# Patient Record
Sex: MALE | Race: BLACK OR AFRICAN AMERICAN | NOT HISPANIC OR LATINO | Employment: UNEMPLOYED | URBAN - METROPOLITAN AREA
[De-identification: names, ages, dates, MRNs, and addresses within clinical notes are randomized per-mention and may not be internally consistent; named-entity substitution may affect disease eponyms.]

---

## 2020-03-19 ENCOUNTER — HOSPITAL ENCOUNTER (OUTPATIENT)
Facility: HOSPITAL | Age: 55
Setting detail: OBSERVATION
Discharge: HOME | End: 2020-03-20
Attending: EMERGENCY MEDICINE | Admitting: INTERNAL MEDICINE
Payer: COMMERCIAL

## 2020-03-19 ENCOUNTER — APPOINTMENT (EMERGENCY)
Dept: RADIOLOGY | Facility: HOSPITAL | Age: 55
End: 2020-03-19
Payer: COMMERCIAL

## 2020-03-19 ENCOUNTER — APPOINTMENT (EMERGENCY)
Dept: CT IMAGING | Facility: HOSPITAL | Age: 55
End: 2020-03-19
Payer: COMMERCIAL

## 2020-03-19 DIAGNOSIS — K80.20 GALLSTONE: ICD-10-CM

## 2020-03-19 DIAGNOSIS — R52 PAIN: ICD-10-CM

## 2020-03-19 DIAGNOSIS — R07.9 CHEST PAIN: Primary | ICD-10-CM

## 2020-03-19 DIAGNOSIS — K44.9 HIATAL HERNIA: ICD-10-CM

## 2020-03-19 DIAGNOSIS — K21.9 GERD (GASTROESOPHAGEAL REFLUX DISEASE): ICD-10-CM

## 2020-03-19 DIAGNOSIS — I10 ESSENTIAL HYPERTENSION: ICD-10-CM

## 2020-03-19 DIAGNOSIS — R93.5 ABNORMAL CT OF THE ABDOMEN: ICD-10-CM

## 2020-03-19 LAB
ALBUMIN SERPL BCP-MCNC: 4 G/DL (ref 3.5–5)
ALP SERPL-CCNC: 77 U/L (ref 46–116)
ALT SERPL W P-5'-P-CCNC: 63 U/L (ref 12–78)
ANION GAP SERPL CALCULATED.3IONS-SCNC: 8 MMOL/L (ref 4–13)
APTT PPP: 35 SECONDS (ref 23–37)
AST SERPL W P-5'-P-CCNC: 47 U/L (ref 5–45)
BASOPHILS # BLD AUTO: 0.02 THOUSANDS/ΜL (ref 0–0.1)
BASOPHILS NFR BLD AUTO: 1 % (ref 0–1)
BILIRUB SERPL-MCNC: 0.74 MG/DL (ref 0.2–1)
BUN SERPL-MCNC: 10 MG/DL (ref 5–25)
CALCIUM SERPL-MCNC: 9.1 MG/DL (ref 8.3–10.1)
CHLORIDE SERPL-SCNC: 103 MMOL/L (ref 100–108)
CK SERPL-CCNC: 170 U/L (ref 39–308)
CO2 SERPL-SCNC: 29 MMOL/L (ref 21–32)
CREAT SERPL-MCNC: 1.13 MG/DL (ref 0.6–1.3)
D DIMER PPP FEU-MCNC: 0.78 UG/ML FEU
EOSINOPHIL # BLD AUTO: 0.04 THOUSAND/ΜL (ref 0–0.61)
EOSINOPHIL NFR BLD AUTO: 1 % (ref 0–6)
ERYTHROCYTE [DISTWIDTH] IN BLOOD BY AUTOMATED COUNT: 13.2 % (ref 11.6–15.1)
GFR SERPL CREATININE-BSD FRML MDRD: 84 ML/MIN/1.73SQ M
GLUCOSE SERPL-MCNC: 111 MG/DL (ref 65–140)
HCT VFR BLD AUTO: 44.1 % (ref 36.5–49.3)
HGB BLD-MCNC: 15.5 G/DL (ref 12–17)
IMM GRANULOCYTES # BLD AUTO: 0 THOUSAND/UL (ref 0–0.2)
IMM GRANULOCYTES NFR BLD AUTO: 0 % (ref 0–2)
INR PPP: 1.18 (ref 0.84–1.19)
LIPASE SERPL-CCNC: 161 U/L (ref 73–393)
LYMPHOCYTES # BLD AUTO: 1.75 THOUSANDS/ΜL (ref 0.6–4.47)
LYMPHOCYTES NFR BLD AUTO: 43 % (ref 14–44)
MCH RBC QN AUTO: 34.2 PG (ref 26.8–34.3)
MCHC RBC AUTO-ENTMCNC: 35.1 G/DL (ref 31.4–37.4)
MCV RBC AUTO: 97 FL (ref 82–98)
MONOCYTES # BLD AUTO: 0.49 THOUSAND/ΜL (ref 0.17–1.22)
MONOCYTES NFR BLD AUTO: 12 % (ref 4–12)
NEUTROPHILS # BLD AUTO: 1.82 THOUSANDS/ΜL (ref 1.85–7.62)
NEUTS SEG NFR BLD AUTO: 43 % (ref 43–75)
NRBC BLD AUTO-RTO: 0 /100 WBCS
PLATELET # BLD AUTO: 113 THOUSANDS/UL (ref 149–390)
PMV BLD AUTO: 10.5 FL (ref 8.9–12.7)
POTASSIUM SERPL-SCNC: 3.9 MMOL/L (ref 3.5–5.3)
PROT SERPL-MCNC: 8 G/DL (ref 6.4–8.2)
PROTHROMBIN TIME: 14.4 SECONDS (ref 11.6–14.5)
RBC # BLD AUTO: 4.53 MILLION/UL (ref 3.88–5.62)
SODIUM SERPL-SCNC: 140 MMOL/L (ref 136–145)
TROPONIN I SERPL-MCNC: <0.02 NG/ML
WBC # BLD AUTO: 4.12 THOUSAND/UL (ref 4.31–10.16)

## 2020-03-19 PROCEDURE — 93005 ELECTROCARDIOGRAM TRACING: CPT

## 2020-03-19 PROCEDURE — 82550 ASSAY OF CK (CPK): CPT | Performed by: EMERGENCY MEDICINE

## 2020-03-19 PROCEDURE — 85610 PROTHROMBIN TIME: CPT | Performed by: EMERGENCY MEDICINE

## 2020-03-19 PROCEDURE — 80053 COMPREHEN METABOLIC PANEL: CPT | Performed by: EMERGENCY MEDICINE

## 2020-03-19 PROCEDURE — 36415 COLL VENOUS BLD VENIPUNCTURE: CPT | Performed by: EMERGENCY MEDICINE

## 2020-03-19 PROCEDURE — 71046 X-RAY EXAM CHEST 2 VIEWS: CPT

## 2020-03-19 PROCEDURE — 83690 ASSAY OF LIPASE: CPT | Performed by: EMERGENCY MEDICINE

## 2020-03-19 PROCEDURE — 85379 FIBRIN DEGRADATION QUANT: CPT | Performed by: EMERGENCY MEDICINE

## 2020-03-19 PROCEDURE — 99285 EMERGENCY DEPT VISIT HI MDM: CPT

## 2020-03-19 PROCEDURE — 85730 THROMBOPLASTIN TIME PARTIAL: CPT | Performed by: EMERGENCY MEDICINE

## 2020-03-19 PROCEDURE — 99284 EMERGENCY DEPT VISIT MOD MDM: CPT | Performed by: EMERGENCY MEDICINE

## 2020-03-19 PROCEDURE — 83036 HEMOGLOBIN GLYCOSYLATED A1C: CPT | Performed by: PHYSICIAN ASSISTANT

## 2020-03-19 PROCEDURE — 84484 ASSAY OF TROPONIN QUANT: CPT | Performed by: EMERGENCY MEDICINE

## 2020-03-19 PROCEDURE — 82553 CREATINE MB FRACTION: CPT | Performed by: EMERGENCY MEDICINE

## 2020-03-19 PROCEDURE — 85025 COMPLETE CBC W/AUTO DIFF WBC: CPT | Performed by: EMERGENCY MEDICINE

## 2020-03-19 PROCEDURE — 71275 CT ANGIOGRAPHY CHEST: CPT

## 2020-03-19 PROCEDURE — 96360 HYDRATION IV INFUSION INIT: CPT

## 2020-03-19 RX ORDER — LISINOPRIL AND HYDROCHLOROTHIAZIDE 20; 12.5 MG/1; MG/1
1 TABLET ORAL DAILY
COMMUNITY

## 2020-03-19 RX ORDER — ASPIRIN 81 MG/1
162 TABLET, CHEWABLE ORAL ONCE
Status: COMPLETED | OUTPATIENT
Start: 2020-03-19 | End: 2020-03-19

## 2020-03-19 RX ADMIN — IOHEXOL 100 ML: 350 INJECTION, SOLUTION INTRAVENOUS at 23:59

## 2020-03-19 RX ADMIN — ASPIRIN 81 MG 162 MG: 81 TABLET ORAL at 22:45

## 2020-03-19 RX ADMIN — NITROGLYCERIN 0.5 INCH: 20 OINTMENT TOPICAL at 22:46

## 2020-03-19 RX ADMIN — SODIUM CHLORIDE 1000 ML: 0.9 INJECTION, SOLUTION INTRAVENOUS at 23:45

## 2020-03-20 VITALS
SYSTOLIC BLOOD PRESSURE: 112 MMHG | OXYGEN SATURATION: 98 % | DIASTOLIC BLOOD PRESSURE: 62 MMHG | RESPIRATION RATE: 16 BRPM | TEMPERATURE: 97.9 F | HEART RATE: 85 BPM | WEIGHT: 143.96 LBS | HEIGHT: 64 IN | BODY MASS INDEX: 24.58 KG/M2

## 2020-03-20 PROBLEM — R93.5 ABNORMAL CT OF THE ABDOMEN: Status: ACTIVE | Noted: 2020-03-20

## 2020-03-20 PROBLEM — R07.9 CHEST PAIN: Status: ACTIVE | Noted: 2020-03-20

## 2020-03-20 PROBLEM — R93.89 ABNORMAL CT SCAN: Status: ACTIVE | Noted: 2020-03-20

## 2020-03-20 PROBLEM — I10 ESSENTIAL HYPERTENSION: Status: ACTIVE | Noted: 2020-03-20

## 2020-03-20 LAB
ANION GAP SERPL CALCULATED.3IONS-SCNC: 8 MMOL/L (ref 4–13)
ATRIAL RATE: 83 BPM
BUN SERPL-MCNC: 9 MG/DL (ref 5–25)
CALCIUM SERPL-MCNC: 8.9 MG/DL (ref 8.3–10.1)
CHLORIDE SERPL-SCNC: 104 MMOL/L (ref 100–108)
CHOLEST SERPL-MCNC: 155 MG/DL (ref 50–200)
CK MB SERPL-MCNC: 0.5 NG/ML (ref 0–5)
CK MB SERPL-MCNC: <1 % (ref 0–2.5)
CO2 SERPL-SCNC: 28 MMOL/L (ref 21–32)
CREAT SERPL-MCNC: 1.06 MG/DL (ref 0.6–1.3)
ERYTHROCYTE [DISTWIDTH] IN BLOOD BY AUTOMATED COUNT: 13.3 % (ref 11.6–15.1)
EST. AVERAGE GLUCOSE BLD GHB EST-MCNC: 97 MG/DL
GFR SERPL CREATININE-BSD FRML MDRD: 90 ML/MIN/1.73SQ M
GLUCOSE P FAST SERPL-MCNC: 96 MG/DL (ref 65–99)
GLUCOSE SERPL-MCNC: 96 MG/DL (ref 65–140)
HBA1C MFR BLD: 5 %
HCT VFR BLD AUTO: 39.7 % (ref 36.5–49.3)
HDLC SERPL-MCNC: 61 MG/DL
HGB BLD-MCNC: 13.8 G/DL (ref 12–17)
LDLC SERPL CALC-MCNC: 85 MG/DL (ref 0–100)
MCH RBC QN AUTO: 34.2 PG (ref 26.8–34.3)
MCHC RBC AUTO-ENTMCNC: 34.8 G/DL (ref 31.4–37.4)
MCV RBC AUTO: 98 FL (ref 82–98)
NONHDLC SERPL-MCNC: 94 MG/DL
P AXIS: 69 DEGREES
PLATELET # BLD AUTO: 101 THOUSANDS/UL (ref 149–390)
PMV BLD AUTO: 11 FL (ref 8.9–12.7)
POTASSIUM SERPL-SCNC: 3.8 MMOL/L (ref 3.5–5.3)
PR INTERVAL: 172 MS
QRS AXIS: 51 DEGREES
QRSD INTERVAL: 74 MS
QT INTERVAL: 340 MS
QTC INTERVAL: 399 MS
RBC # BLD AUTO: 4.04 MILLION/UL (ref 3.88–5.62)
SODIUM SERPL-SCNC: 140 MMOL/L (ref 136–145)
T WAVE AXIS: 67 DEGREES
TRIGL SERPL-MCNC: 46 MG/DL
TROPONIN I SERPL-MCNC: <0.02 NG/ML
TROPONIN I SERPL-MCNC: <0.02 NG/ML
VENTRICULAR RATE: 83 BPM
WBC # BLD AUTO: 4.66 THOUSAND/UL (ref 4.31–10.16)

## 2020-03-20 PROCEDURE — 93010 ELECTROCARDIOGRAM REPORT: CPT | Performed by: INTERNAL MEDICINE

## 2020-03-20 PROCEDURE — 80061 LIPID PANEL: CPT | Performed by: PHYSICIAN ASSISTANT

## 2020-03-20 PROCEDURE — C9113 INJ PANTOPRAZOLE SODIUM, VIA: HCPCS | Performed by: EMERGENCY MEDICINE

## 2020-03-20 PROCEDURE — 80048 BASIC METABOLIC PNL TOTAL CA: CPT | Performed by: PHYSICIAN ASSISTANT

## 2020-03-20 PROCEDURE — 99236 HOSP IP/OBS SAME DATE HI 85: CPT | Performed by: INTERNAL MEDICINE

## 2020-03-20 PROCEDURE — 84484 ASSAY OF TROPONIN QUANT: CPT | Performed by: PHYSICIAN ASSISTANT

## 2020-03-20 PROCEDURE — 85027 COMPLETE CBC AUTOMATED: CPT | Performed by: PHYSICIAN ASSISTANT

## 2020-03-20 RX ORDER — ALUMINUM HYDROXIDE, MAGNESIUM HYDROXIDE, SIMETHICONE 400; 400; 40 MG/10ML; MG/10ML; MG/10ML
15 SUSPENSION ORAL 3 TIMES DAILY PRN
Qty: 355 ML | Refills: 0 | Status: SHIPPED | OUTPATIENT
Start: 2020-03-20

## 2020-03-20 RX ORDER — PANTOPRAZOLE SODIUM 40 MG/1
40 INJECTION, POWDER, FOR SOLUTION INTRAVENOUS ONCE
Status: COMPLETED | OUTPATIENT
Start: 2020-03-20 | End: 2020-03-20

## 2020-03-20 RX ORDER — ACETAMINOPHEN 325 MG/1
650 TABLET ORAL EVERY 6 HOURS PRN
Status: DISCONTINUED | OUTPATIENT
Start: 2020-03-20 | End: 2020-03-20 | Stop reason: HOSPADM

## 2020-03-20 RX ORDER — PANTOPRAZOLE SODIUM 40 MG/1
40 TABLET, DELAYED RELEASE ORAL
Qty: 30 TABLET | Refills: 0 | Status: SHIPPED | OUTPATIENT
Start: 2020-03-20

## 2020-03-20 RX ORDER — ACETAMINOPHEN 325 MG/1
650 TABLET ORAL EVERY 6 HOURS PRN
Refills: 0
Start: 2020-03-20

## 2020-03-20 RX ADMIN — LISINOPRIL: 20 TABLET ORAL at 10:23

## 2020-03-20 RX ADMIN — PANTOPRAZOLE SODIUM 40 MG: 40 INJECTION, POWDER, FOR SOLUTION INTRAVENOUS at 00:40

## 2020-03-20 NOTE — UTILIZATION REVIEW
Initial Clinical Review    Admission: Date/Time/Statement: Admission Orders (From admission, onward)     Ordered        03/20/20 0028  Place in Observation  Once                   Orders Placed This Encounter   Procedures    Place in Observation     Telemetry     Standing Status:   Standing     Number of Occurrences:   1     Order Specific Question:   Admitting Physician     Answer:   Eddie Red [[id number]]     Order Specific Question:   Level of Care     Answer:   Med Surg [[id number]]     Order Specific Question:   Bed request comments     Answer:   telemetry     ED Arrival Information     Expected Arrival [address] Yuly Clarke of Arrival Escorted By Service Admission Type    3/19/2020  3/19/2020 22:19 Emergent [facility]In [facility] Hospitalist Emergency    Arrival Complaint    Chest Pain ([employer])        Chief Complaint   Patient presents with    Chest Pain     patient reports having chest pain starting in the beginning of the week  denies SOB, wheezing or difficluty breathing  no cough  no cardiac history previously  Assessment/Plan:   Mr Sherif Villafuerte is a 64 yo male who presents to the ED from home with c/o intermittent chest pressure with L arm and leg pain and weakness with it  Rated 9-10/10 at it's worst   Recently returned from [place] on a 9 hr flight  Has elevated D dimer but CTA was negative for PE  Has recent unintentional weight loss and frequent bathroom visits with decreased appetite  + polyuria  PMH:  HTN  He is admitted to OBSERVATION status with Chest Pain - trend troponins, tele, fasting lipid and A1C  HTN - home Lisinopril and HCTZ        ED Triage Vitals   Temperature Pulse Respirations Blood Pressure SpO2   03/19/20 2233 03/19/20 2228 03/19/20 2228 03/19/20 2228 03/19/20 2228   98 5 °F (36 9 °C) 95 18 133/78 99 %      Temp Source Heart Rate Source Patient Position - Orthostatic VS BP Location FiO2 (%)   03/19/20 2233 03/19/20 2228 03/19/20 2228 03/19/20 2228 --   Oral Monitor Sitting Right arm Pain Score       03/19/20 2300       No Pain        Wt Readings from Last 1 Encounters:   03/19/20 65 3 kg (143 lb 15 4 oz)     Additional Vital Signs:   03/20/20 0734  97 9 °F (36 6 °C)  85  16  106/58    98 %  None (Room air)   03/20/20 0101    88  18  121/62    98 %  None (Room air)   03/20/20 0045    82        99 %     03/20/20 0030    70  18  129/72  94  99 %  None (Room air)   03/19/20 2330    74  18  126/66  90  99 %  None (Room air)   03/19/20 2303              None (Room air)   03/19/20 2300    92  18  123/69  91  99 %  None (Room air)     Pertinent Labs/Diagnostic Test Results:     3/19 CXR - no focal consolidation     3/19 CTA ED chest PE study - 1  No evidence of pulmonary embolism  2   No focal consolidation  3   Small hiatal hernia and thickening of the esophagus which may be due to esophagitis, correlate with endoscopy  4   Nodular contour of the liver, correlate for cirrhosis  5   Cholelithiasis without evidence of cholecystitis      3/19 ECG - NSR possible LAE    Results from last 7 days   Lab Units 03/20/20  0438 03/19/20  2257   WBC Thousand/uL 4 66 4 12*   HEMOGLOBIN g/dL 13 8 15 5   HEMATOCRIT % 39 7 44 1   PLATELETS Thousands/uL 101* 113*   NEUTROS ABS Thousands/µL  --  1 82*         Results from last 7 days   Lab Units 03/20/20  0438 03/19/20  2257   SODIUM mmol/L 140 140   POTASSIUM mmol/L 3 8 3 9   CHLORIDE mmol/L 104 103   CO2 mmol/L 28 29   ANION GAP mmol/L 8 8   BUN mg/dL 9 10   CREATININE mg/dL 1 06 1 13   EGFR ml/min/1 73sq m 90 84   CALCIUM mg/dL 8 9 9 1     Results from last 7 days   Lab Units 03/19/20  2257   AST U/L 47*   ALT U/L 63   ALK PHOS U/L 77   TOTAL PROTEIN g/dL 8 0   ALBUMIN g/dL 4 0   TOTAL BILIRUBIN mg/dL 0 74     Results from last 7 days   Lab Units 03/20/20  0438 03/19/20  2257   GLUCOSE RANDOM mg/dL 96 111     Results from last 7 days   Lab Units 03/19/20  2257   HEMOGLOBIN A1C % 5 0   EAG mg/dl 97     Results from last 7 days   Lab Units 03/19/20  2257   CK TOTAL U/L 170   CK MB INDEX % <1 0   CK MB ng/mL 0 5     Results from last 7 days   Lab Units 03/20/20  0438 03/20/20  0131 03/19/20  2257   TROPONIN I ng/mL <0 02 <0 02 <0 02     Results from last 7 days   Lab Units 03/19/20  2257   D-DIMER QUANTITATIVE ug/ml FEU 0 78*     Results from last 7 days   Lab Units 03/19/20  2257   PROTIME seconds 14 4   INR  1 18   PTT seconds 35     Results from last 7 days   Lab Units 03/19/20  2257   LIPASE u/L 161     ED Treatment:   Medication Administration from 03/19/2020 2150 to 03/20/2020 0115    Date/Time Order Dose Route Action   03/19/2020 2245 aspirin chewable tablet 162 mg 162 mg Oral Given   03/19/2020 2246 nitroglycerin (NITRO-BID) 2 % TD ointment 0 5 inch 0 5 inch Topical Given   03/19/2020 2345 sodium chloride 0 9 % bolus 1,000 mL 1,000 mL Intravenous New Bag   03/19/2020 2359 iohexol (OMNIPAQUE) 350 MG/ML injection (MULTI-DOSE) 100 mL 100 mL Intravenous Given   03/20/2020 0040 pantoprazole (PROTONIX) injection 40 mg 40 mg Intravenous Given        Past Medical History:   Diagnosis Date    Hypertension      Present on Admission:   Abnormal CT of the abdomen      Admitting Diagnosis: Hiatal hernia [K44 9]  Gallstone [K80 20]  Chest pain [R07 9]     Age/Sex: 63 y o  male     Admission Orders:  Scheduled Medications:    Medications:  lisinopril-hydrochlorothiazide (PRINZIDE 20/12  5) combo dose  Oral Daily     Continuous IV Infusions:     PRN Meds:    acetaminophen 650 mg Oral Q6H PRN     Tele  SCDs  Trend troponins  Ambulate q shift   Cardiac diet, no caffeine, chocolate    Network Utilization Review Department  [email or web address]  ATTENTION: Please call with any questions or concerns to [phone] and carefully listen to the prompts so that you are directed to the right person   All voicemails are confidential   Gary Villaseñor all requests for admission clinical reviews, approved or denied determinations and any other requests to dedicated fax number below belonging to the campus where the patient is receiving treatment   List of dedicated fax numbers for the Facilities:  [address] [place] [address] DENIALS (Administrative/Medical Necessity) [phone]   [address]  (Maternity/NICU/Pediatrics) [phone]   [facility] [phone]   Holden Florencia [phone]   [facility] De Anda [phone]   Alexsandra Ingram [phone]   [address] [place] [address] [phone]   [facility]  [phone]   [address] [facility]  [address] [facility] [address] [facility] [phone]

## 2020-03-20 NOTE — ASSESSMENT & PLAN NOTE
· Blood pressure is well controlled in the emergency department  · Patient is on outpatient combination of lisinopril and hydrochlorothiazide  · Will divide these give lisinopril 20 mg daily, and hydrochlorothiazide 12 5 mg daily  · Monitor BP

## 2020-03-20 NOTE — ASSESSMENT & PLAN NOTE
 Patient Presentation:  Has been having chest pressure for approximately 1 week that is intermittent in nature  Denies worsening with exertion   Likely etiology:  Unclear etiology at this time could be related to MSK versus GERD  Doubt ACS however   VALENTINE: 0   Initial Troponin: neg  o Trend x3 or to peak   Initial EKG: NSR with no ST/T wave changes of ischemia   o Monitor on telemetry   Check fasting lipid panel and A1c   Admit under Observation Status

## 2020-03-20 NOTE — DISCHARGE INSTR - AVS FIRST PAGE
Dear Meliton Reza,     It was our pleasure to care for you here at [facility]  It is our hope that we were always able to exceed the expected standards for your care during your stay  You were hospitalized due to chest pain (no heart attack; likely due to heartburn with possible inflammation/thickening of the esophagus)  You were cared for on the 3rd floor under the service of Edin Cisneros MD with the [facility] who covers for your primary care physician (PCP), No primary care provider on file  , while you were hospitalized  If you have any questions or concerns related to this hospitalization, you may contact us at [phone]  For follow up as well as medication refills, we recommend that you follow up with your primary care physician  A registered nurse will reach out to you by phone within a few days after your discharge to answer any additional questions that you may have after going home  However, at this time we provide for you here, the most important instructions / recommendations at discharge:     · Notable Medication Adjustments -   · You were prescribed with Protonix to be taken before breakfast   This will prevent acid reflux from happening, that may have caused your chest pain  You were also prescribed with Maalox, to be taken on as needed basis if with heartburn  · Testing Required after Discharge -   · You may need an upper endoscopy in the outpatient  The gastroenterologist will evaluate you for this  · Important follow up information -   · I already referred you to follow-up with outpatient doctors which include:  Primary care physician and gastroenterologist   · Other Instructions -   · Do not take any nonsteroidal anti-inflammatory drugs like ibuprofen, Motrin, Advil, Aleve, naproxen, etc   Have dinner at least 2 hours prior to going to bed  Elevate your head of bed or pop up pillows when you sleep    Do not wear tight clothes/garments  Do not take any mints, caffeinated drinks, sodas, alcoholic beverages  If smoking, stop smoking  The above instructions is to prevent any stomach hyperacidity and acid reflux, which can cause pains  · Please review this entire after visit summary as additional general instructions including medication list, appointments, activity, diet, any pertinent wound care, and other additional recommendations from your care team that may be provided for you        Sincerely,     Carolee Smith MD

## 2020-03-20 NOTE — QUICK NOTE
I went back again to see the patient, as patient wants me to talk to his wife  Patient was already all dressed up to leave and doing fine  Patient did not have any complaints  I finally spoke to the patient's wife, using his phone  I explained everything to the patient's wife with our findings and discharge plans and discharge instructions  I answered all questions and concerns  She is okay with the discharge plans  She is okay with discharge of her  to home today

## 2020-03-20 NOTE — ASSESSMENT & PLAN NOTE
 Resolved   Patient Presentation:  Has been having chest pressure for approximately 1 week that is intermittent in nature  Denies worsening with exertion   Likely etiology: GERD  As according to the patient, he admitted that at times, he would have acid fluid", coming up to his chest and eventually to his throat and he would be able to taste it in his mouth  Patient also was found on the CT scan with thickening of the distal esophagus, likely esophagitis, with hiatal hernia   VALENTINE: 0   Troponins:  Negative   Initial EKG: NSR with no ST/T wave changes of ischemia   o Telemetry:  No significant arrhythmias   Fasting lipid panel:  LDL of 85;  A1c:  5 0%     CT scan did not reveal any pulmonary embolism   Patient was given IV Protonix in the ER   On discharge, patient will be maintained on Protonix before breakfast   I will also prescribe the patient also with Maalox p r n  For heartburn  I gave instructions to the patient regarding how to prevent having acid reflux  I instructed the patient not to take any NSAIDs; no mints, alcoholic beverages, caffeinated drinks; no smoking, if smoking; elevate head of bed/pop up pillows when he sleeps; have dinner at least 2 hours prior to going to bed; no tight clothes or garments  I spoke to the physician assistant for the gastroenterologist and we will refer the patient in the ambulatory setting to be seen sooner rather than later, likely for an upper endoscopy  The above instructions were discussed with the patient

## 2020-03-20 NOTE — PROGRESS NOTES
Patient asleep in bed  No visible signs of distress noted at this time  Bed low and locked; call bell within reach  Will continue to monitor   Karl Schwartz RN

## 2020-03-20 NOTE — H&P
H&P- Eron Matthews 1965, 63 y o  male MRN: [id number]  Unit/Bed#: MIGUEL Encounter: [id number]  Primary Care Provider: No primary care provider on file  Date and time admitted to hospital: 3/19/2020 10:25 PM    * Chest pain  Assessment & Plan   Patient Presentation:  Has been having chest pressure for approximately 1 week that is intermittent in nature  Denies worsening with exertion   Likely etiology:  Unclear etiology at this time could be related to MSK versus GERD  Doubt ACS however   VALENTINE: 0   Initial Troponin: neg  o Trend x3 or to peak   Initial EKG: NSR with no ST/T wave changes of ischemia   o Monitor on telemetry   Check fasting lipid panel and A1c   Admit under Observation Status  Essential hypertension  Assessment & Plan  · Blood pressure is well controlled in the emergency department  · Patient is on outpatient combination of lisinopril and hydrochlorothiazide  · Will divide these give lisinopril 20 mg daily, and hydrochlorothiazide 12 5 mg daily  · Monitor BP  VTE Prophylaxis: Low risk ambulate  / reason for no mechanical VTE prophylaxis Low risk   Code Status:  Full code  POLST: There is no POLST form on file for this patient (pre-hospital)  Discussion with family: patient     Anticipated Length of Stay:  Patient will be admitted on an Observation basis with an anticipated length of stay of  < 2 midnights  Justification for Hospital Stay: chest pain     Total Time for Visit, including Counseling / Coordination of Care: 45 minutes  Greater than 50% of this total time spent on direct patient counseling and coordination of care  Chief Complaint:   Chest pain     History of Present Illness:    Anya Louie is a 63 y o  male who presents with chest pain  Has a pmhx significant for HTN  Patient presents to the emergency department for one-week history of intermittent chest pressure which he rates at approximately a 9/10 when it is at his worse    He denies any palliative or provocative factors to this chest pressure  He states that he is having some associated left-sided arm pain and left-sided leg pain and weakness with this  His neuro exam was nonfocal   Patient did just come back from trip from [place] which he states is a 9 hour plane ride  He did have a slightly elevated D-dimer and CTA did not show any signs of pulmonary embolism  He denies any worsening of his pain with exertion  He denies any diaphoresis, or jaw pain associated with his symptoms  He has never had chest pressure such as this before  He does not have any risk factors and has a VALENTINE score of 0  He denies any family history of any cardiac disease  His wife does state that he has been losing a lot of weight recently  He states that he has been having frequent visits to the bathroom  Has not had a recent A1c checked  His wife is unable to tell me how much weight he has lost   He states that he has had a decreased appetite however he denies any other associated symptoms such as fever, chills, shortness of breath, abdominal pain, nausea, vomiting, diarrhea       Review of Systems:    Review of Systems   Constitutional: Positive for unexpected weight change  Negative for chills, fatigue and fever  Respiratory: Positive for chest tightness  Negative for cough and shortness of breath  Cardiovascular: Positive for chest pain  Negative for palpitations  Gastrointestinal: Negative for abdominal pain, diarrhea, nausea and vomiting  Endocrine: Positive for polyuria  Genitourinary: Positive for frequency  Negative for decreased urine volume, dysuria and urgency  Musculoskeletal: Negative for arthralgias  Neurological: Negative for dizziness, syncope, light-headedness and headaches  All other systems reviewed and are negative  Past Medical and Surgical History:     Past Medical History:   Diagnosis Date    Hypertension        History reviewed   No pertinent surgical history  Meds/Allergies:    Prior to Admission medications    Medication Sig Start Date End Date Taking? Authorizing Provider   lisinopril-hydrochlorothiazide (PRINZIDE,ZESTORETIC) 20-12 5 MG per tablet Take 1 tablet by mouth daily   Yes Historical Provider, MD     I have reviewed home medications with patient personally  Allergies: No Known Allergies    Social History:     Marital Status: /Civil Union   Occupation: [employer]   Patient Pre-hospital Living Situation: lives at home   Patient Pre-hospital Level of Mobility: ambulates   Patient Pre-hospital Diet Restrictions: none   Substance Use History:   Social History     Substance and Sexual Activity   Alcohol Use Not Currently     Social History     Tobacco Use   Smoking Status Never Smoker   Smokeless Tobacco Never Used     Social History     Substance and Sexual Activity   Drug Use Never       Family History:    History reviewed  No pertinent family history  Physical Exam:     Vitals:   Blood Pressure: 121/62 (03/20/20 0101)  Pulse: 88 (03/20/20 0101)  Temperature: 98 5 °F (36 9 °C) (03/19/20 2233)  Temp Source: Oral (03/19/20 2233)  Respirations: 18 (03/20/20 0101)  Height: 5' 4" (162 6 cm) (03/19/20 2228)  Weight - Scale: 65 3 kg (143 lb 15 4 oz) (03/19/20 2228)  SpO2: 98 % (03/20/20 0101)    Physical Exam   Constitutional: He is oriented to person, place, and time  He appears well-developed and well-nourished  No distress  HENT:   Head: Normocephalic and atraumatic  Mouth/Throat: Mucous membranes are not pale, not dry and not cyanotic  Eyes: Pupils are equal, round, and reactive to light  Conjunctivae and EOM are normal    Neck: Normal range of motion  Neck supple  No JVD present  Cardiovascular: Normal rate, regular rhythm and normal heart sounds  No murmur heard  Pulmonary/Chest: Effort normal and breath sounds normal  He has no wheezes  He has no rales  Abdominal: Soft  Bowel sounds are normal  He exhibits no distension   There is no tenderness  Musculoskeletal: Normal range of motion  He exhibits no edema or deformity  No lower extremity edema, Homans sign negative bilaterally   Neurological: He is alert and oriented to person, place, and time  No cranial nerve deficit or sensory deficit  Skin: Skin is warm and dry  Capillary refill takes less than 2 seconds  He is not diaphoretic  Psychiatric:   Flat affect   Nursing note and vitals reviewed  Additional Data:     Lab Results: I have personally reviewed pertinent reports  Results from last 7 days   Lab Units 03/19/20  2257   WBC Thousand/uL 4 12*   HEMOGLOBIN g/dL 15 5   HEMATOCRIT % 44 1   PLATELETS Thousands/uL 113*   NEUTROS PCT % 43   LYMPHS PCT % 43   MONOS PCT % 12   EOS PCT % 1     Results from last 7 days   Lab Units 03/19/20  2257   SODIUM mmol/L 140   POTASSIUM mmol/L 3 9   CHLORIDE mmol/L 103   CO2 mmol/L 29   BUN mg/dL 10   CREATININE mg/dL 1 13   ANION GAP mmol/L 8   CALCIUM mg/dL 9 1   ALBUMIN g/dL 4 0   TOTAL BILIRUBIN mg/dL 0 74   ALK PHOS U/L 77   ALT U/L 63   AST U/L 47*   GLUCOSE RANDOM mg/dL 111     Results from last 7 days   Lab Units 03/19/20  2257   INR  1 18                   Imaging: I have personally reviewed pertinent reports  CTA ED chest PE study   ED Interpretation by Dillan Delarosa MD (03/20 4289)   FINDINGS:      PULMONARY ARTERIAL TREE:  No pulmonary embolus is seen  LUNGS:  Lungs are clear   There is no tracheal or endobronchial lesion  PLEURA:  Unremarkable  HEART/GREAT VESSELS:  Unremarkable for patient's age  MEDIASTINUM AND JUDIE:  Unremarkable  CHEST WALL AND LOWER NECK:   Unremarkable  VISUALIZED STRUCTURES IN THE UPPER ABDOMEN:  Nodular contour of liver, correlate for cirrhosis   Cholelithiasis without evidence of cholecystitis   Small hiatal hernia   Thickening of the esophagus which may be due to esophagitis  OSSEOUS STRUCTURES:  No acute fracture or destructive osseous lesion  Impression:        1   No evidence of pulmonary embolism  2   No focal consolidation  3   Small hiatal hernia and thickening of the esophagus which may be due to esophagitis, correlate with endoscopy  4   Nodular contour of the liver, correlate for cirrhosis  5   Cholelithiasis without evidence of cholecystitis  Workstation performed: [id number]         Final Result by Kenia Garcias MD (03/20 0017)      1  No evidence of pulmonary embolism  2   No focal consolidation  3   Small hiatal hernia and thickening of the esophagus which may be due to esophagitis, correlate with endoscopy  4   Nodular contour of the liver, correlate for cirrhosis  5   Cholelithiasis without evidence of cholecystitis  Workstation performed: [id number]         XR chest 2 views   ED Interpretation by Kim Montoya MD (03/19 1745)   No acute disease read by me  Final Result by Kenia Garcias MD (03/20 0012)      No focal consolidation  Workstation performed: [id number]             EKG, Pathology, and Other Studies Reviewed on Admission:   · EKG:  Normal sinus rhythm with no ST T wave changes concerning for ischemia  Ventricular rate of 83  · Chest x-ray:  No acute cardiopulmonary disease on my read  Allscripts / Epic Records Reviewed: Yes     ** Please Note: This note has been constructed using a voice recognition system   **

## 2020-03-20 NOTE — PLAN OF CARE
Problem: Nutrition/Hydration-ADULT  Goal: Nutrient/Hydration intake appropriate for improving, restoring or maintaining nutritional needs  Description  Monitor and assess patient's nutrition/hydration status for malnutrition  Collaborate with interdisciplinary team and initiate plan and interventions as ordered  Monitor patient's weight and dietary intake as ordered or per policy  Utilize nutrition screening tool and intervene as necessary  Determine patient's food preferences and provide high-protein, high-caloric foods as appropriate       INTERVENTIONS:  - Monitor oral intake, urinary output, labs, and treatment plans  - Assess nutrition and hydration status and recommend course of action  - Evaluate amount of meals eaten  - Assist patient with eating if necessary   - Allow adequate time for meals  - Recommend/ encourage appropriate diets, oral nutritional supplements, and vitamin/mineral supplements  - Order, calculate, and assess calorie counts as needed  - Recommend, monitor, and adjust tube feedings and TPN/PPN based on assessed needs  - Assess need for intravenous fluids  - Provide specific nutrition/hydration education as appropriate  - Include patient/family/caregiver in decisions related to nutrition  Outcome: Progressing     Problem: CARDIOVASCULAR - ADULT  Goal: Maintains optimal cardiac output and hemodynamic stability  Description  INTERVENTIONS:  - Monitor I/O, vital signs and rhythm  - Monitor for S/S and trends of decreased cardiac output  - Administer and titrate ordered vasoactive medications to optimize hemodynamic stability  - Assess quality of pulses, skin color and temperature  - Assess for signs of decreased coronary artery perfusion  - Instruct patient to report change in severity of symptoms  Outcome: Progressing  Goal: Absence of cardiac dysrhythmias or at baseline rhythm  Description  INTERVENTIONS:  - Continuous cardiac monitoring, vital signs, obtain 12 lead EKG if ordered  - Administer antiarrhythmic and heart rate control medications as ordered  - Monitor electrolytes and administer replacement therapy as ordered  Outcome: Progressing

## 2020-03-20 NOTE — INCIDENTAL FINDINGS
The following findings require follow up:  Radiographic finding   Finding:  Nodular contour of the liver, possible cirrhosis  Cholelithiasis without cholecystitis   Follow up required:  Outpatient follow-up with GI     Follow up should be done within 4 week(s)    Please notify the following clinician to assist with the follow up:   Dr Sayda Ramos

## 2020-03-20 NOTE — DISCHARGE SUMMARY
Discharge- Jose Persons 1965, 63 y o  male MRN: [id number]    Unit/Bed#: S -01 Encounter: [id number]    Primary Care Provider: No primary care provider on file  Date and time admitted to hospital: 3/19/2020 10:25 PM        * Chest pain  Assessment & Plan   Resolved   Patient Presentation:  Has been having chest pressure for approximately 1 week that is intermittent in nature  Denies worsening with exertion   Likely etiology: GERD  As according to the patient, he admitted that at times, he would have acid fluid", coming up to his chest and eventually to his throat and he would be able to taste it in his mouth  Patient also was found on the CT scan with thickening of the distal esophagus, likely esophagitis, with hiatal hernia   VALENTINE: 0   Troponins:  Negative   Initial EKG: NSR with no ST/T wave changes of ischemia   o Telemetry:  No significant arrhythmias   Fasting lipid panel:  LDL of 85;  A1c:  5 0%     CT scan did not reveal any pulmonary embolism   Patient was given IV Protonix in the ER   On discharge, patient will be maintained on Protonix before breakfast   I will also prescribe the patient also with Maalox p r n  For heartburn  I gave instructions to the patient regarding how to prevent having acid reflux  I instructed the patient not to take any NSAIDs; no mints, alcoholic beverages, caffeinated drinks; no smoking, if smoking; elevate head of bed/pop up pillows when he sleeps; have dinner at least 2 hours prior to going to bed; no tight clothes or garments  I spoke to the physician assistant for the gastroenterologist and we will refer the patient in the ambulatory setting to be seen sooner rather than later, likely for an upper endoscopy  The above instructions were discussed with the patient  Abnormal CT scan  Assessment & Plan  · CT scan revealed:  No evidence of pulmonary embolism  No focal consolidation   Small hiatal hernia and thickening of the esophagus which may be due to esophagitis, correlate with endoscopy  Nodular contour of the liver, correlate for cirrhosis  Cholelithiasis without evidence of cholecystitis  · Outpatient follow-up with gastroenterologist   This is being set up already  Essential hypertension  Assessment & Plan  · Blood pressure is well controlled  · Patient is on outpatient combination of lisinopril and hydrochlorothiazide  Continue  · Outpatient follow-up with primary care physician  Patient does not have a primary care physician yet  From discussion with the patient, we [facility]  Discharging Physician / Practitioner: Linda Islas MD  PCP: No primary care provider on file  Admission Date:   Admission Orders (From admission, onward)     Ordered        03/20/20 0028  Place in Observation  Once                   Discharge Date: 03/20/20        Consultations During Hospital Stay:  · None  Procedures Performed:     · Please see diagnosis and notes above  Significant Findings / Test Results:     · Please see diagnosis and notes above  Incidental Findings:   · Nodular contour of the liver, possible cirrhosis  Cholelithiasis without cholecystitis  Outpatient follow-up with gastroenterologist     Test Results Pending at Discharge (will require follow up): · None  Outpatient Tests Requested:  · None  However, for possible upper endoscopy in the outpatient  Gastroenterologist will evaluate patient for this  Complications:  None  Reason for Admission:  Chest pain  Hospital Course:     Martinez Rendon is a 63 y o  male patient who originally presented to the hospital on 3/19/2020 due to chest pain  EKG did not reveal any acute ischemic changes  Telemetry did not reveal any significant arrhythmias  Troponins were negative  It was found out, that patient had been having acid reflux symptoms/heartburn    CT scan revealed thickening of the distal esophagus, likely esophagitis, thus likely patient has GERD which may be the cause of patient's chest pain  Patient was given IV Protonix here  Thus, patient was prescribed with Protonix as well as Maalox p r n  Britt Aspen Patient's condition improved  Patient's pain had resolved  For details about patient's diagnosis, workups, management, hospital course and discharge plans, please see above list of diagnoses and related notes  Condition at Discharge: stable     Discharge Day Visit / Exam:     Subjective:    Patient is doing fine and feels a lot better  Patient denies any more pains  No shortness of breath  No complaints  Patient is okay with his discharge to home today  Vitals: Blood Pressure: 112/62 (03/20/20 1016)  Pulse: 85 (03/20/20 0734)  Temperature: 97 9 °F (36 6 °C) (03/20/20 0734)  Temp Source: Oral (03/20/20 0734)  Respirations: 16 (03/20/20 0734)  Height: 5' 4" (162 6 cm) (03/19/20 2228)  Weight - Scale: 65 3 kg (143 lb 15 4 oz) (03/19/20 2228)  SpO2: 98 % (03/20/20 0734)  Exam:   Physical Exam   Constitutional: No distress  HENT:   Head: Normocephalic and atraumatic  Eyes: Right eye exhibits no discharge  Left eye exhibits no discharge  No scleral icterus  Neck: No JVD present  No tracheal deviation present  No thyromegaly present  Cardiovascular: Normal rate, regular rhythm and normal heart sounds  Exam reveals no gallop and no friction rub  No murmur heard  Pulmonary/Chest: Effort normal and breath sounds normal  No stridor  No respiratory distress  He has no wheezes  He has no rales  Abdominal: Soft  Bowel sounds are normal  He exhibits no distension and no mass  There is no tenderness  There is no rebound and no guarding  Musculoskeletal: He exhibits no edema, tenderness or deformity  Neurological: He is alert  Skin: Skin is warm  No rash noted  He is not diaphoretic  No erythema  No pallor  Psychiatric: He has a normal mood and affect   His behavior is normal  Thought content normal    Vitals reviewed  Discussion with Family:  I tried several times (at least 5 times), calling her wife (number was verified by the nurse from the patient himself to be right), however, they were always busy tone and I could not connect to her phone  Discharge instructions/Information to patient and family:   See after visit summary for information provided to patient and family  Provisions for Follow-Up Care:  See after visit summary for information related to follow-up care and any pertinent home health orders  Disposition:     Home    For Discharges to [facility]:   · Not Applicable to this Patient - Not Applicable to this Patient    Planned Readmission:  None  Discharge Statement:  I spent 40 minutes discharging the patient  This time was spent on the day of discharge  I had direct contact with the patient on the day of discharge  Greater than 50% of the total time was spent examining patient, answering all patient questions, arranging and discussing plan of care with patient as well as directly providing post-discharge instructions  Additional time then spent on discharge activities  Discharge Medications:  See after visit summary for reconciled discharge medications provided to patient and family        ** Please Note: This note has been constructed using a voice recognition system **

## 2020-03-20 NOTE — ASSESSMENT & PLAN NOTE
· Blood pressure is well controlled  · Patient is on outpatient combination of lisinopril and hydrochlorothiazide  Continue  · Outpatient follow-up with primary care physician  Patient does not have a primary care physician yet  From discussion with the patient, we provided him with an [facility]

## 2020-03-20 NOTE — ED PROVIDER NOTES
History  Chief Complaint   Patient presents with    Chest Pain     patient reports having chest pain starting in the beginning of the week  denies SOB, wheezing or difficluty breathing  no cough  no cardiac history previously  Patient is a 63year old male with intermittent worsening chest pain not pleuritic since earlier this week  No N/V  No cough  No sob  No fever  No diarrhea  No CAD in family  Patient is on medication for HTN  Denies DM or high cholesterol  Denies smoking  Was sent from [facility] for evaluation  No recent old records from this ED seen on computer system  [facility] website checked on this patient and no Rx found  Came back from [facility] on 3/3/20  EKG reviewed by me from [facility] which appears normal        History provided by:  Patient and spouse   used: No    Chest Pain   Associated symptoms: no cough, no fever, no nausea, no shortness of breath and not vomiting        Prior to Admission Medications   Prescriptions Last Dose Informant Patient Reported? Taking?   lisinopril-hydrochlorothiazide (PRINZIDE,ZESTORETIC) 20-12 5 MG per tablet 3/19/2020 at Unknown time  Yes Yes   Sig: Take 1 tablet by mouth daily      Facility-Administered Medications: None       Past Medical History:   Diagnosis Date    Hypertension        History reviewed  No pertinent surgical history  History reviewed  No pertinent family history  I have reviewed and agree with the history as documented  E-Cigarette/Vaping    E-Cigarette Use Never User      E-Cigarette/Vaping Substances    Nicotine No     THC No     CBD No     Flavoring No     Other No     Unknown No      Social History     Tobacco Use    Smoking status: Never Smoker    Smokeless tobacco: Never Used   Substance Use Topics    Alcohol use: Not Currently    Drug use: Never       Review of Systems   Constitutional: Negative for fever  Respiratory: Negative for cough and shortness of breath      Cardiovascular: Positive for chest pain  Gastrointestinal: Negative for diarrhea, nausea and vomiting  All other systems reviewed and are negative  Physical Exam  Physical Exam   Constitutional: He is oriented to person, place, and time  He appears well-developed and well-nourished  He appears distressed (moderate)  HENT:   Head: Normocephalic and atraumatic  Moist mucous membranes  Eyes: No scleral icterus  Neck: No JVD present  No tracheal deviation present  Cardiovascular: Normal rate, regular rhythm and normal heart sounds  No murmur heard  Pulmonary/Chest: Effort normal and breath sounds normal  No stridor  No respiratory distress  He has no wheezes  He has no rales  He exhibits no tenderness  Abdominal: Soft  Bowel sounds are normal  There is no tenderness  Musculoskeletal: He exhibits no edema or deformity  Neurological: He is alert and oriented to person, place, and time  Skin: Skin is warm and dry  No rash noted  Psychiatric: He has a normal mood and affect  Nursing note and vitals reviewed        Vital Signs  ED Triage Vitals   Temperature Pulse Respirations Blood Pressure SpO2   03/19/20 2233 03/19/20 2228 03/19/20 2228 03/19/20 2228 03/19/20 2228   98 5 °F (36 9 °C) 95 18 133/78 99 %      Temp Source Heart Rate Source Patient Position - Orthostatic VS BP Location FiO2 (%)   03/19/20 2233 03/19/20 2228 03/19/20 2228 03/19/20 2228 --   Oral Monitor Sitting Right arm       Pain Score       03/19/20 2300       No Pain           Vitals:    03/19/20 2228 03/19/20 2300 03/19/20 2330   BP: 133/78 123/69 126/66   Pulse: 95 92 74   Patient Position - Orthostatic VS: Sitting Sitting Sitting         Visual Acuity      ED Medications  Medications   sodium chloride 0 9 % bolus 1,000 mL (has no administration in time range)   pantoprazole (PROTONIX) injection 40 mg (has no administration in time range)   aspirin chewable tablet 162 mg (162 mg Oral Given 3/19/20 2245)   nitroglycerin (NITRO-BID) 2 % TD ointment 0 5 inch (0 5 inches Topical Given 3/19/20 2246)   iohexol (OMNIPAQUE) 350 MG/ML injection (MULTI-DOSE) 100 mL (100 mL Intravenous Given 3/19/20 2359)       Diagnostic Studies  Results Reviewed     Procedure Component Value Units Date/Time    CKMB [[id number]]  (Normal) Collected:  03/19/20 2257    Lab Status:  Final result Specimen:  Blood from Arm, Right Updated:  03/20/20 0003     CK-MB Index <1 0 %      CK-MB 0 5 ng/mL     Lipase [[id number]]  (Normal) Collected:  03/19/20 2257    Lab Status:  Final result Specimen:  Blood from Arm, Right Updated:  03/19/20 2340     Lipase 161 u/L     CK Total with Reflex CKMB [[id number]]  (Normal) Collected:  03/19/20 2257    Lab Status:  Final result Specimen:  Blood from Arm, Right Updated:  03/19/20 2339     Total  U/L     Troponin I [[id number]]  (Normal) Collected:  03/19/20 2257    Lab Status:  Final result Specimen:  Blood from Arm, Right Updated:  03/19/20 2323     Troponin I <0 02 ng/mL     D-Dimer [[id number]]  (Abnormal) Collected:  03/19/20 2257    Lab Status:  Final result Specimen:  Blood from Arm, Right Updated:  03/19/20 2321     D-Dimer, Quant 0 78 ug/ml FEU     Comprehensive metabolic panel [[id number]]  (Abnormal) Collected:  03/19/20 2257    Lab Status:  Final result Specimen:  Blood from Arm, Right Updated:  03/19/20 2320     Sodium 140 mmol/L      Potassium 3 9 mmol/L      Chloride 103 mmol/L      CO2 29 mmol/L      ANION GAP 8 mmol/L      BUN 10 mg/dL      Creatinine 1 13 mg/dL      Glucose 111 mg/dL      Calcium 9 1 mg/dL      AST 47 U/L      ALT 63 U/L      Alkaline Phosphatase 77 U/L      Total Protein 8 0 g/dL      Albumin 4 0 g/dL      Total Bilirubin 0 74 mg/dL      eGFR 84 ml/min/1 73sq m     Narrative:       [place] guidelines for Chronic Kidney Disease (CKD):     Stage 1 with normal or high GFR (GFR > 90 mL/min/1 73 square meters)    Stage 2 Mild CKD (GFR = 60-89 mL/min/1 73 square meters)    Stage 3A Moderate CKD (GFR = 45-59 mL/min/1 73 square meters)    Stage 3B Moderate CKD (GFR = 30-44 mL/min/1 73 square meters)    Stage 4 Severe CKD (GFR = 15-29 mL/min/1 73 square meters)    Stage 5 End Stage CKD (GFR <15 mL/min/1 73 square meters)  Note: GFR calculation is accurate only with a steady state creatinine    Protime-INR [[id number]]  (Normal) Collected:  03/19/20 2257    Lab Status:  Final result Specimen:  Blood from Arm, Right Updated:  03/19/20 2315     Protime 14 4 seconds      INR 1 18    APTT [[id number]]  (Normal) Collected:  03/19/20 2257    Lab Status:  Final result Specimen:  Blood from Arm, Right Updated:  03/19/20 2315     PTT 35 seconds     CBC and differential [[id number]]  (Abnormal) Collected:  03/19/20 2257    Lab Status:  Final result Specimen:  Blood from Arm, Right Updated:  03/19/20 2313     WBC 4 12 Thousand/uL      RBC 4 53 Million/uL      Hemoglobin 15 5 g/dL      Hematocrit 44 1 %      MCV 97 fL      MCH 34 2 pg      MCHC 35 1 g/dL      RDW 13 2 %      MPV 10 5 fL      Platelets 765 Thousands/uL      nRBC 0 /100 WBCs      Neutrophils Relative 43 %      Immat GRANS % 0 %      Lymphocytes Relative 43 %      Monocytes Relative 12 %      Eosinophils Relative 1 %      Basophils Relative 1 %      Neutrophils Absolute 1 82 Thousands/µL      Immature Grans Absolute 0 00 Thousand/uL      Lymphocytes Absolute 1 75 Thousands/µL      Monocytes Absolute 0 49 Thousand/µL      Eosinophils Absolute 0 04 Thousand/µL      Basophils Absolute 0 02 Thousands/µL                  CTA ED chest PE study   ED Interpretation by Clinton Martinez MD (03/20 0019)   FINDINGS:      PULMONARY ARTERIAL TREE:  No pulmonary embolus is seen  LUNGS:  Lungs are clear   There is no tracheal or endobronchial lesion  PLEURA:  Unremarkable  HEART/GREAT VESSELS:  Unremarkable for patient's age  MEDIASTINUM AND JUDIE:  Unremarkable  CHEST WALL AND LOWER NECK:   Unremarkable        VISUALIZED STRUCTURES IN THE UPPER ABDOMEN:  Nodular contour of liver, correlate for cirrhosis   Cholelithiasis without evidence of cholecystitis   Small hiatal hernia   Thickening of the esophagus which may be due to esophagitis  OSSEOUS STRUCTURES:  No acute fracture or destructive osseous lesion  Impression:        1   No evidence of pulmonary embolism  2   No focal consolidation  3   Small hiatal hernia and thickening of the esophagus which may be due to esophagitis, correlate with endoscopy  4   Nodular contour of the liver, correlate for cirrhosis  5   Cholelithiasis without evidence of cholecystitis  Workstation performed: [id number]         Final Result by Jocelyne Patel MD (03/20 0017)      1  No evidence of pulmonary embolism  2   No focal consolidation  3   Small hiatal hernia and thickening of the esophagus which may be due to esophagitis, correlate with endoscopy  4   Nodular contour of the liver, correlate for cirrhosis  5   Cholelithiasis without evidence of cholecystitis  Workstation performed: [id number]         XR chest 2 views   ED Interpretation by Roxana Lopez MD (03/19 2313)   No acute disease read by me  Final Result by Jocelyne Patel MD (03/20 0012)      No focal consolidation              Workstation performed: [id number]                    Procedures  ECG 12 Lead Documentation Only  Date/Time: 3/19/2020 10:45 PM  Performed by: Roxana Lopez MD  Authorized by: Roxana Lopez MD     Indications / Diagnosis:  Chest pain  ECG reviewed by me, the ED Provider: yes    Patient location:  ED  Previous ECG:     Previous ECG:  Compared to current    Comparison ECG info:  From [facility] tonight    Similarity:  No change  Rate:     ECG rate:  83    ECG rate assessment: normal    Rhythm:     Rhythm: sinus rhythm    Ectopy:     Ectopy: none    QRS:     QRS axis:  Normal    QRS intervals:  Normal  Conduction:     Conduction: normal    ST segments:     ST segments: Normal  T waves:     T waves: normal    Comments:      Possible LAE             ED Course  ED Course as of Mar 20 0028   Thu Mar 19, 2020   2246 EKG d/w patient and wife with patient's permission  [address] [place] [address] d/w patient and wife  Patient feeling better  2326 CT PE study ordered  D-Dimer, Quant(!): 0 78   Fri Mar 20, 2020   0022 CT chest d/w patient and wife               HEART Risk Score      Most Recent Value   Heart Score Risk Calculator   History  2 Filed at: 03/19/2020 2323   ECG  0 Filed at: 03/19/2020 2323   Age  1 Filed at: 03/19/2020 2323   Risk Factors  1 Filed at: 03/19/2020 2323   Troponin  0 Filed at: 03/19/2020 2323   HEART Score  4 Filed at: 03/19/2020 2323              PERC Rule for PE      Most Recent Value   PERC Rule for PE   Age >=50  1 Filed at: 03/19/2020 2243   HR >=100     O2 Sat on room air < 95%     History of PE or DVT     Recent trauma or surgery     Hemoptysis     Exogenous estrogen     Unilateral leg swelling     PERC Rule for PE Results  1 Filed at: 03/19/2020 2243              VALENTINE Risk Score      Most Recent Value   Age >= 72  0 Filed at: 03/19/2020 2323   Known CAD (stenosis >= 50%)  0 Filed at: 03/19/2020 2323   Recent (<=24 hrs) Service Angina  0 Filed at: 03/19/2020 2323   ST Deviation >= 0 5 mm  0 Filed at: 03/19/2020 2323   3+ CAD Risk Factors (FHx, HTN, HLP, DM, Smoker)  0 Filed at: 03/19/2020 2323   Aspirin Use Past 7 Days  0 Filed at: 03/19/2020 2323   Elevated Cardiac Markers  0 Filed at: 03/19/2020 2323   VALENTINE Risk Score (Calculated)  0 Filed at: 03/19/2020 2323        Yara Isaac Criteria for PE      Most Recent Value   Bry' Criteria for PE   Clinical signs and symptoms of DVT  0 Filed at: 03/19/2020 2243   PE is primary diagnosis or equally likely  3 Filed at: 03/19/2020 2243   HR >100  0 Filed at: 03/19/2020 2243   Immobilization at least 3 days or Surgery in the previous 4 weeks  0 Filed at: 03/19/2020 2243   Previous, objectively diagnosed PE or DVT  0 Filed at: 03/19/2020 2243   Hemoptysis  0 Filed at: 03/19/2020 2243   Malignancy with treatment within 6 months or palliative  0 Filed at: 03/19/2020 2243   Wells' Criteria Total  3 Filed at: 03/19/2020 2243            [facility]  Number of Diagnoses or Management Options  Diagnosis management comments: DDX including but not limited to: ACS, MI, PE, PTX, pneumonia, doubt dissection, pleurisy, pericarditis, myocarditis, rhabdomyolysis, GI etiology  Doubt COVID-19 or other infectious etiology  Amount and/or Complexity of Data Reviewed  Clinical lab tests: ordered and reviewed  Tests in the radiology section of CPT®: ordered and reviewed  Decide to obtain previous medical records or to obtain history from someone other than the patient: yes  Obtain history from someone other than the patient: yes  Discuss the patient with other providers: yes  Independent visualization of images, tracings, or specimens: yes          Disposition  Final diagnoses:   Chest pain   Gallstone   Hiatal hernia     Time reflects when diagnosis was documented in both MDM as applicable and the Disposition within this note     Time User Action Codes Description Comment    3/19/2020 11:26 PM Merryl Gala Add [R07 9] Chest pain     3/20/2020 12:22 AM Merryl Gala Add [K80 20] Gallstone     3/20/2020 12:22 AM Merryl Gala Add [K44 9] Hiatal hernia       ED Disposition     ED Disposition Condition Date/Time Comment    Admit Stable Fri Mar 20, 2020 12:28 AM Case was discussed with CLARA Martinez  and the patient's admission status was agreed to be Admission Status: observation status to the service of Dr Solares [place]          Follow-up Information    None         Patient's Medications   Discharge Prescriptions    No medications on file     No discharge procedures on file      PDMP Review       Value Time User    PDMP Reviewed  Yes 3/19/2020 10:26 PM Darylene Knee, MD          ED Provider  Electronically Signed by           Sulma Valdez MD  03/20/20 9612

## 2020-03-26 ENCOUNTER — TELEMEDICINE (OUTPATIENT)
Dept: INTERNAL MEDICINE CLINIC | Facility: CLINIC | Age: 55
End: 2020-03-26
Payer: COMMERCIAL

## 2020-03-26 DIAGNOSIS — K21.00 GASTROESOPHAGEAL REFLUX DISEASE WITH ESOPHAGITIS: ICD-10-CM

## 2020-03-26 DIAGNOSIS — R42 DIZZINESS: Primary | ICD-10-CM

## 2020-03-26 PROCEDURE — 99241 PR OFFICE CONSULTATION NEW/ESTAB PATIENT 15 MIN: CPT | Performed by: HOSPITALIST

## 2020-03-26 RX ORDER — MECLIZINE HCL 12.5 MG/1
12.5 TABLET ORAL 3 TIMES DAILY PRN
Qty: 30 TABLET | Refills: 0 | Status: SHIPPED | OUTPATIENT
Start: 2020-03-26

## 2020-03-26 NOTE — PROGRESS NOTES
Virtual Regular Visit    Problem List Items Addressed This Visit     None      Visit Diagnoses     Dizziness    -  Primary    Relevant Medications    meclizine (ANTIVERT) 12 5 MG tablet    Gastroesophageal reflux disease with esophagitis                   Reason for visit is  Follow-up checkup    Encounter provider Hiram Rouse MD    Provider located at [address] [facility] [place] [address] [facility]  [phone]      Recent Visits  No visits were found meeting these conditions  Showing recent visits within past 7 days and meeting all other requirements     Today's Visits  Date Type Provider Dept   03/26/20 Brant ANN [de-identified], MD [facility] [place]   Showing today's visits and meeting all other requirements     Future Appointments  No visits were found meeting these conditions  Showing future appointments within next 150 days and meeting all other requirements        After connecting through [employer], the patient was identified by name and date of birth  Anya Louie was informed that this is a telemedicine visit and that the visit is being conducted through [employer] and patient was informed that this is not a secure, HIPAA-complaint platform  he agrees to proceed  which may not be secure and therefore, might not be HIPAA-compliant  My office door was closed  The following individuals were in the room with me and the patient informed Dr Melchor Subramanian   He acknowledged consent and understanding of privacy and security of the video platform  The patient has agreed to participate and understands they can discontinue the visit at any time      Subjective  Anya Louie is a 63 y o  male  Was recently hospitalized on 03/19 for chest pain, during hospitalization it was found out that symptoms is likely related to GERD with esophagitis as he described the chest pain as acid going up to his chest    CT of the chest showed:Small hiatal hernia and thickening of the esophagus which may be due to esophagitis, correlate with endoscopy  Patient has a past medical history of hypertension  Patient reported that is no longer having heartburn and has been taking his Protonix daily, however  Was not able to get   As needed maalox,  Told the patient that he does not need to take them Aloxi if he is not having GERD symptoms  Patient would like to follow-up with the GI doctor Dr Fiorella John  patient was also complaining of dizziness, according to him he had several tests for it and they cannot find anything that may cause it  Dizziness has been going on for a while now,Last for a few minutes, worse with head movement for almost a month, usually happen sometimes as often as 3 times a week but resolves by itself  Will recommend the patient to take meclizine as needed 3 times a day  For dizziness  If worsening of symptom and no improvement will need further workup  Past Medical History:   Diagnosis Date    Hypertension        No past surgical history on file  Current Outpatient Medications   Medication Sig Dispense Refill    acetaminophen (TYLENOL) 325 mg tablet Take 2 tablets (650 mg total) by mouth every 6 (six) hours as needed for mild pain, headaches or fever  0    aluminum-magnesium hydroxide-simethicone (MAALOX) 200-200-20 MG/5ML SUSP Take 15 mL by mouth 3 (three) times a day as needed for indigestion or heartburn 355 mL 0    lisinopril-hydrochlorothiazide (PRINZIDE,ZESTORETIC) 20-12 5 MG per tablet Take 1 tablet by mouth daily      meclizine (ANTIVERT) 12 5 MG tablet Take 1 tablet (12 5 mg total) by mouth 3 (three) times a day as needed for dizziness 30 tablet 0    pantoprazole (PROTONIX) 40 mg tablet Take 1 tablet (40 mg total) by mouth daily before breakfast 30 tablet 0     No current facility-administered medications for this visit  No Known Allergies    Review of Systems   Neurological: Positive for dizziness   Negative for weakness, light-headedness and numbness  All other systems reviewed and are negative  I spent 15 minutes with the patient during this visit

## 2020-06-05 ENCOUNTER — TELEPHONE (OUTPATIENT)
Dept: GASTROENTEROLOGY | Facility: CLINIC | Age: 55
End: 2020-06-05

## 2020-06-08 ENCOUNTER — TELEPHONE (OUTPATIENT)
Dept: GASTROENTEROLOGY | Facility: AMBULARY SURGERY CENTER | Age: 55
End: 2020-06-08

## 2020-06-08 ENCOUNTER — TELEPHONE (OUTPATIENT)
Dept: GASTROENTEROLOGY | Facility: CLINIC | Age: 55
End: 2020-06-08

## 2020-06-09 ENCOUNTER — TELEMEDICINE (OUTPATIENT)
Dept: GASTROENTEROLOGY | Facility: CLINIC | Age: 55
End: 2020-06-09
Payer: COMMERCIAL

## 2020-06-09 DIAGNOSIS — Z12.11 COLON CANCER SCREENING: ICD-10-CM

## 2020-06-09 DIAGNOSIS — R63.4 WEIGHT LOSS: ICD-10-CM

## 2020-06-09 DIAGNOSIS — R74.8 ELEVATED LIVER ENZYMES: ICD-10-CM

## 2020-06-09 DIAGNOSIS — R93.89 IMAGING ABNORMALITY: Primary | ICD-10-CM

## 2020-06-09 DIAGNOSIS — K21.00 GASTROESOPHAGEAL REFLUX DISEASE WITH ESOPHAGITIS: ICD-10-CM

## 2020-06-09 DIAGNOSIS — R93.2 ABNORMAL FINDING ON IMAGING OF LIVER: ICD-10-CM

## 2020-06-09 DIAGNOSIS — R68.81 EARLY SATIETY: ICD-10-CM

## 2020-06-09 PROCEDURE — 99243 OFF/OP CNSLTJ NEW/EST LOW 30: CPT | Performed by: INTERNAL MEDICINE

## 2020-06-09 RX ORDER — ESOMEPRAZOLE MAGNESIUM 40 MG/1
40 CAPSULE, DELAYED RELEASE ORAL DAILY
Qty: 30 CAPSULE | Refills: 2 | Status: SHIPPED | OUTPATIENT
Start: 2020-06-09

## 2020-07-08 ENCOUNTER — TELEPHONE (OUTPATIENT)
Dept: RADIOLOGY | Facility: HOSPITAL | Age: 55
End: 2020-07-08

## 2020-07-13 ENCOUNTER — TELEPHONE (OUTPATIENT)
Dept: GASTROENTEROLOGY | Facility: AMBULARY SURGERY CENTER | Age: 55
End: 2020-07-13

## 2020-07-13 NOTE — TELEPHONE ENCOUNTER
----- Message from [facility], MD sent at 6/9/2020  3:42 PM EDT -----  Please schedule for EGD and colonoscopy in next 2 weeks       Thank you,  Bronwyn Beard

## 2022-12-01 ENCOUNTER — HOSPITAL ENCOUNTER (OUTPATIENT)
Dept: RADIOLOGY | Facility: HOSPITAL | Age: 57
Discharge: HOME | End: 2022-12-01

## 2022-12-01 ENCOUNTER — OFFICE VISIT (OUTPATIENT)
Dept: LAB | Facility: HOSPITAL | Age: 57
End: 2022-12-01

## 2022-12-01 DIAGNOSIS — Z01.812 PRE-OPERATIVE LABORATORY EXAMINATION: ICD-10-CM

## 2022-12-01 DIAGNOSIS — K40.00 BILATERAL INGUINAL HERNIA WITH OBSTRUCTION AND WITHOUT GANGRENE, RECURRENCE NOT SPECIFIED: ICD-10-CM

## 2022-12-01 DIAGNOSIS — Z01.818 OTHER SPECIFIED PRE-OPERATIVE EXAMINATION: ICD-10-CM

## 2022-12-01 LAB
ATRIAL RATE: 94 BPM
P AXIS: 69 DEGREES
PR INTERVAL: 152 MS
QRS AXIS: 40 DEGREES
QRSD INTERVAL: 72 MS
QT INTERVAL: 336 MS
QTC INTERVAL: 420 MS
T WAVE AXIS: 68 DEGREES
VENTRICULAR RATE: 94 BPM

## 2023-08-02 ENCOUNTER — EVALUATION (OUTPATIENT)
Facility: CLINIC | Age: 58
End: 2023-08-02
Payer: COMMERCIAL

## 2023-08-02 DIAGNOSIS — G20 PARKINSON'S DISEASE (HCC): Primary | ICD-10-CM

## 2023-08-02 DIAGNOSIS — G25.9 MOVEMENT DISORDER: ICD-10-CM

## 2023-08-02 PROCEDURE — 97163 PT EVAL HIGH COMPLEX 45 MIN: CPT

## 2023-08-02 NOTE — LETTER
August 3, 2023    [address] [facility], [facility] Manny Justin    Patient: Romana Denver   YOB: 1965   Date of Visit: 2023     Encounter Diagnosis     ICD-10-CM    1. Parkinson's disease ([address])  G20       2. Movement disorder  G25.9           Dear Dr. Jose Lobo: Thank you for your recent referral of Romana Denver. Please review the attached evaluation summary from Frino's recent visit. Please verify that you agree with the plan of care by signing the attached order. If you have any questions or concerns, please do not hesitate to call. I sincerely appreciate the opportunity to share in the care of one of your patients and hope to have another opportunity to work with you in the near future. Sincerely,    Raúl Hill, PT      Referring Provider:      I certify that I have read the below Plan of Care and certify the need for these services furnished under this plan of treatment while under my care. [address] [facility],  [facility] [address]  Via Fax: [phone]          PT Evaluation         POC expires Auth Status Total   Visits  Start date  Expiration date PT/OT + Visit Limit? Co-Insurance   10/25/23 Submitted  23  BOMN No                                           Visit/Unit Tracking  AUTH Status: submitted Date 23              Visits  Authed: pending Used eval               Remaining                    Neurologist Dr. Jose Lobo   Last Visit to Neurologist 8/3/23   Medication Sinemet   Medication Frequency 3x per day   Dosing Times              Today's date: 2023  Patient name: Romana Denver  : 1965  MRN: [id number]  Referring provider: Amara Schafer MD  Dx:   Encounter Diagnosis     ICD-10-CM    1. Parkinson's disease ([address])  G20       2. Movement disorder  G25.9                 Assessment  Assessment details: Patient is a 60 y.o.  Male who presents to skilled outpatient PT with recent diagonsis of Parkinson's disease. He presents with bradykinesia, shuffling of gait, freezing, and imbalance which prevent him from functioning at his optimal level. Patient is deemed at high risk of falls per 5x STS and miniBEST. His gait speed of 0.96 m/s deems him an unlimited community ambulator; however this speed is insufficient to safely cross a street in the community. He displays absent balance reactions in posterior and lateral directions, which further perpetuates risk of falls. As a result of his symptoms, he is unable to work and his wife acts as caregiver. Patient is interested in the following program to address noted deficits: [employer] and [employer]. He/She presents with the following symptoms that are consistent with Nilda and Yahr stage 3: mild to moderate postural instability. Per research provided by [employer], patient will benefit from skilled outpatient PT services to improve and maximize his/her function, to reduce risk for falls and potential injuries associated with falls, reduce healthcare costs via hospitalization, and reduce patient and national healthcare costs. In early stages of Parkinson's Disease, research indicates that intensive physical exercise can improve patient's motor control and assist in slowing the disease progression as a neuroprotective agent. Patient will benefit from skilled outpatient PT in order to maximize function in the short-term and long-term with overall improved postural strength with associated stability and mobility.       Impairments: Abnormal coordination, Abnormal gait, Abnormal muscle tone, Abnormal or restricted ROM, Activity intolerance, Impaired balance, Impaired physical strength, Lacks appropriate HEP, Poor posture, Poor body mechanics, Pain with function, Safety issue, Weight-bearing intolerance, Abnormal movement, Difficulty understanding, Abnormal muscle firing  Understanding of Dx/Px/POC: Good  Prognosis: Good      Patient verbalized understanding of POC. Please contact me if you have any questions or recommendations. Thank you for the referral and the opportunity to share in [facility] care.            Plan  Plan details: skilled PT focused on large amplitude movement training; perform 6MWT, supine to stand test, and PDQ 39 next visit   Program: [employer] [employer]  Planned modality interventions: Biofeedback, Cryotherapy, TENS, Thermotherapy  Planned therapy interventions: Abdominal trunk stabilization, ADL training, Balance, Balance/WB training, Breathing training, Body mechanics training, Coordination, Functional ROM exercises, Gait training, HEP, Joint Mobilization, Manual Therapy, Smith taping, Motor coordination training, Neuromuscular re-education, Patient education, Postural training, Strengthening, Stretching, Therapeutic activities, Therapeutic exercises, Therapeutic training, Transfer training, Activity modification, Work reintegration  Frequency: 2x/wk  Duration in weeks: 12  Plan of Care beginning date: 8/2/23  Plan of Care expiration date: 12 weeks - 10/25/23  Treatment plan discussed with: Patient and Family         Goals  Short Term Goals (4 weeks):  - Patient will improve 5 STS by the [place] [facility] of 2.3 sec indicating an improvement in strength and decreased challenge with transfers  - Patient will improve 6 MWT by the [facility] of 269 ft indicating an improvement in cardiovascular endurance in order to maximize function with functional mobility throughout the community  - Patient will improve MiniBESTest score by MDC of 5.52 points indicating an improvement with dynamic balance in order to further decrease risk of falls with functional tasks  - Patient will be independent in basic HEP in order to manage condition at home    Long Term Goals (12 weeks):  - Patient will score a low risk for falls 2/4 fall risks measures  - Patient will score age norm values for 1/2 endurance measures  - Patient will be able to ambulate on uneven surfaces with 50% reduction in near falls to increase safety with community mobility  - Patient will be able to perform a floor transfer without physical assistance to assist with fall recovery at home and in the community  - Patient will be independent in comprehensive HEP post discharge from program  - Patient will be able to walk up incline with decreased difficulty and no loss of balance in order to improve functional mobility throughout the community  - Patient will be able to perform sit to stands from softer surfaces such as a couch or bed in order to improvement function with transfers  - Patient will be consistent with program for at least 1 day per week to assist with management of condition and functional independence of their condition        Cut off score   All date taken from [facility] Neuro Section or Rehab Measures      PEREZ Cutoff Scores:  MDC: 5 pts  Falls Risk Cutoff: 40.22/56 DGI Cutoff Scores:  Obie mckeon 2011, MDC: 2.9 pts  Laura garcia al 2008, Clementeen Pu: Score <19/24   MiniBEST Cutoff Scores:  Adrian mckeon 2011, MDC: 5.52 pts  Zi Parker 2013, Clementeen Pu: <19/28 5xSTS Cutoff Scores:  MDC: 2.3 sec  Ludy Roca et al, 2011, Michelle: > 16 sec   TUG Cutoff Scores:  Sebastian Childs et al, 2011, MDC: 4.8 sec  Balinda Sicard et al, 2011, Fallers: Meds ON: < 12.21 sec, OFF: 15.5 sec 10 Meter Walk Test Cutoff Scores:  García Haskins, 2008, MDC: 0.18 m/s  Age Norm Values, Michelle: < 1.0 m/s   ABC Cutoff Scores:  Sherice Chaidez, 2008, MDC: 13 pts  Sebastian Childs, MDC: 11.12 pts  Increased risk for falls: < 69% 6 Minute Walk Test Cutoff Scores:  García Haskins, 2008, MDC: 269 ft  [facility] al, 2002, Norm Data Healthy Adults:  61 - 71 years:   M: 200 m      F: 1 m  79 - 78 years:   M: 1 m      F: 200 m  80 - 80 years:   M: 1 m      F: 392 m   PDQ-39 Cutoff Scores:  Pato mckeon, 2004:  MDC: Mobility (12.24), ADL (16.72), Emotional (14.22), Stigma (21.21), Social Support (21.25), Cognition (21.12), Communication (21.04), Bodily Discomfort (24.48)  Carina et al, 2007:  Normative Data: Mobility (49.25), ADL (38.94), Emotional (37.92), Stigma (27.54), Social Support (14.78), Cognition (33.03), Communication (27.99), Bodily Discomfort (40.91) Nilda and Yahr Stages  Stage 0: No S/S  Stage 1: Mild unilateral symptoms  Stage 1.5: Unilateral and axial symptoms  Stage 2: Bilateral symptoms, no balance impairment  Stage 2.5: Mild bilateral symptoms and recovery with pull test  Stage 3: Mild to moderate postural instability, independent  Stage 4: Severe disability, walking or standing unassisted  Stage 5: Bed bound, w/c bound           Subjective Evaluation    History of Present Illness  Mechanism of injury: Patient reports with diagnosis of Parkinson's Disease about 1 month ago via Dr. Tracy Torres. However he has been experiencing symptoms including tremors since 2019. He takes Sinemet 3x per day. Primary AD: none  Previous Programs: none    Pain  L shoulder/arm - chronic; currently 3/10     Social Support  Steps to enter house: 2-3 ELIDA   Stairs in house: full flight    Lives with: wife    Employment status: not working  Hand dominance: RHD    Treatments  Previous treatment: Sinemet  Current treatment: PT   Diagnostic Testing: pending       Objective   Gait abnormalities: shuffling gait, decreased/absent arm swing    MDS/UPDRS Part III  - Is the patient on medications for treating symptoms of PD? Yes  - If receiving medication for treatment: ON: typical functional state when receiving medication and having a good response  - Is the patient on Levodopa?  Yes        - If yes, how many minutes since last dose: 120 min  - Speech: 3 - Moderate: Speech is difficulty to understand to the point that some, but not most, sentences are poorly understood  - Facial Expression: 2 - Mild: in addition to decreased eye-blink frequency, masked facies present in the lower face as well, namely fewer movements around the mouth, such as less spontaneous smiling, but lips not parted  - Rigidity:  - Neck: 0 - Normal: No rigidity  - RUE: 0 - Normal: No rigidity  - LUE: 0 - Normal: No rigidity  - RLE: 0 - Normal: No rigidity  - LLE: 0 - Normal: No rigidity  - Finger Tapping:  - R: 3 - Moderate: Any of the following: (a) > 5 interruptions during tapping or at least 1 longer arrest/freeze in ongoing movement, (b) moderate slowing, (c) the amplitude decrements starting after the 1st tap  - L: 2 -Mild: Any of the following: (a) 3-5 interruptions during tapping, (b) mild slowing, (c) the amplitude decrements midway in the 10-tap sequence  - Hand Movements:   - R: 2 - Mild: Any of the following: (a) 3-5 interruptions during the movements, (b) mild slowing, (c) the amplitude decrements midway in the task   - L: 2 - Mild: Any of the following: (a) 3-5 interruptions during the movements, (b) mild slowing, (c) the amplitude decrements midway in the task  - Pronation-Supination Movements of Hands:   - R: 2 - Mild: Any of the following: (a) 3-5 interruptions during the movements, (b) mild slowing, (c) the amplitude decrements midway in the sequence   - L: 2 - Mild: Any of the following: (a) 3-5 interruptions during the movements, (b) mild slowing, (c) the amplitude decrements midway in the sequence  - Toe Tapping:   - R: 2 - Mild: Any of the following: (a) 3-5 interruptions during tapping movements, (b) mild slowing, (c) amplitude decrements midway in the task   - L: 2 - Mild: Any of the following: (a) 3-5 interruptions during tapping movements, (b) mild slowing, (c) amplitude decrements midway in the task  - Leg Agility:   - R: 2 - Mild: Any of the following: (a) 3-5 interruptions during the movements, (b) mild slowness, (c) amplitude decrements midway in the task   - L: 2 - Mild: Any of the following: (a) 3-5 interruptions during the movements, (b) mild slowness, (c) amplitude decrements midway in the task  - Arising from Chair: 1 - Slight: Arising is slower than normal, or may need > 1 attempt, or may need to move forward in the chair. No use of UE  - Gait: 1 - Slight: Independent walking with minor gait impairment  - Freezing of Gait: 1 - Slight: Freezes on starting, turning, or walking through doorway with a single halt during any of these activities, but then continues smoothly without freezing during straight walking  - Postural Stability: 3 - Moderate: Stands safely, but with absence of postural response; falls if not caught by examiner)  - Posture: 2 - Mild: Definite flexion, scoliosis/leaning to 1 side, but can correct posture to normal posture when asked to do so  - Global Spontaneity of Movement (Body Bradykinesia): 2 - Mild: Mild global slowness and poverty of spontaneous movements  - Postural Tremor of the Hands:   - R: 1 - Slight: Tremor present, but < 1 cm in amplitude   - L: 1 - Slight: Tremor present, but < 1 cm in amplitude  - Kinetic Tremor of the Hands:   - R: 1 - Slight: Tremor present, but < 1 cm in amplitude   - L: 1 - Slight: Tremor present, but < 1 cm in amplitude  - Rest Tremor Amplitude:   - RUE: 1 - Slight: Tremor present, but < 1 cm in amplitude   - LUE: 1 - Slight: Tremor present, but < 1 cm in amplitude   - RLE: 1 - Slight: Tremor present, but < 1 cm in amplitude   - LLE: 1 - Slight: Tremor present, but < 1 cm in amplitude   - Lip/Jaw: 0 - Normal: No tremor  - Constancy of Rest Tremor: 1 - Slight: Tremor at rest is present 0-25% of the entire examination period  - Dyskinesia Impact on Part III Ratings:   - Were dyskinesias (chorea or dystonia) present during examination? No   - If yes, did these movements interfere with your ratings?  No  - Nilda and Yahr Stage: 3.0 - Mild to moderate involvement, some postural instability but physically independent, and needs assistance to recover from pull test          Outcome Measures Initial Eval  8/2/23        5xSTS 23.3 sec, no UE        TUG  - Regular  - Cognitive  - Carry   9.03 sec  NP sec  9.5 sec MiniBEST 17/28        10 meter 0.96 m/s        6MWT defer ft        ABC defer%        PDQ-39 defer/100        H&Y Stage 3.0        Floor > Stand defer sec                      Precautions: fall risk  Past Medical History:   Diagnosis Date   • Hypertension

## 2023-08-02 NOTE — PROGRESS NOTES
PT Evaluation          POC expires Auth Status Total   Visits  Start date  Expiration date PT/OT + Visit Limit? Co-Insurance   10/25/23 Submitted  23  BOMN No                                           Visit/Unit Tracking  AUTH Status: submitted Date 23              Visits  Authed: pending Used eval               Yong                    Neurologist Dr. Santi Nieves   Last Visit to Neurologist 8/3/23   Medication Sinemet   Medication Frequency 3x per day   Dosing Times               Today's date: 2023  Patient name: Carlee Mukherjee  : 1965  MRN: [id number]  Referring provider: Hazel Anderson MD  Dx:   Encounter Diagnosis     ICD-10-CM    1. Parkinson's disease ([address])  G20       2. Movement disorder  G25.9                 Assessment  Assessment details: Patient is a 60 y.o. Male who presents to skilled outpatient PT with recent diagonsis of Parkinson's disease. He presents with bradykinesia, shuffling of gait, freezing, and imbalance which prevent him from functioning at his optimal level. Patient is deemed at high risk of falls per 5x STS and miniBEST. His gait speed of 0.96 m/s deems him an unlimited community ambulator; however this speed is insufficient to safely cross a street in the community. He displays absent balance reactions in posterior and lateral directions, which further perpetuates risk of falls. As a result of his symptoms, he is unable to work and his wife acts as caregiver. Patient is interested in the following program to address noted deficits: [facility] and [employer]. He/She presents with the following symptoms that are consistent with Nilda and Yahr stage 3: mild to moderate postural instability.  Per research provided by [employer], patient will benefit from skilled outpatient PT services to improve and maximize his/her function, to reduce risk for falls and potential injuries associated with falls, reduce healthcare costs via hospitalization, and reduce patient and national healthcare costs. In early stages of Parkinson's Disease, research indicates that intensive physical exercise can improve patient's motor control and assist in slowing the disease progression as a neuroprotective agent. Patient will benefit from skilled outpatient PT in order to maximize function in the short-term and long-term with overall improved postural strength with associated stability and mobility. Impairments: Abnormal coordination, Abnormal gait, Abnormal muscle tone, Abnormal or restricted ROM, Activity intolerance, Impaired balance, Impaired physical strength, Lacks appropriate HEP, Poor posture, Poor body mechanics, Pain with function, Safety issue, Weight-bearing intolerance, Abnormal movement, Difficulty understanding, Abnormal muscle firing  Understanding of Dx/Px/POC: Good  Prognosis: Good      Patient verbalized understanding of POC. Please contact me if you have any questions or recommendations. Thank you for the referral and the opportunity to share in [facility].            Plan  Plan details: skilled PT focused on large amplitude movement training; perform 6MWT, supine to stand test, and PDQ 39 next visit   Program: [facility]  Planned modality interventions: Biofeedback, Cryotherapy, TENS, Thermotherapy  Planned therapy interventions: Abdominal trunk stabilization, ADL training, Balance, Balance/WB training, Breathing training, Body mechanics training, Coordination, Functional ROM exercises, Gait training, HEP, Joint Mobilization, Manual Therapy, Smith taping, Motor coordination training, Neuromuscular re-education, Patient education, Postural training, Strengthening, Stretching, Therapeutic activities, Therapeutic exercises, Therapeutic training, Transfer training, Activity modification, Work reintegration  Frequency: 2x/wk  Duration in weeks: 12  Plan of Care beginning date: 8/2/23  Plan of Care expiration date: 12 weeks - 10/25/23  Treatment plan discussed with: Patient and Family         Goals  Short Term Goals (4 weeks):  - Patient will improve 5 STS by the [place] of 2.3 sec indicating an improvement in strength and decreased challenge with transfers  - Patient will improve 6 MWT by the [place] of 269 ft indicating an improvement in cardiovascular endurance in order to maximize function with functional mobility throughout the community  - Patient will improve MiniBESTest score by MDC of 5.52 points indicating an improvement with dynamic balance in order to further decrease risk of falls with functional tasks  - Patient will be independent in basic HEP in order to manage condition at home    Long Term Goals (12 weeks):  - Patient will score a low risk for falls 2/4 fall risks measures  - Patient will score age norm values for 1/2 endurance measures  - Patient will be able to ambulate on uneven surfaces with 50% reduction in near falls to increase safety with community mobility  - Patient will be able to perform a floor transfer without physical assistance to assist with fall recovery at home and in the community  - Patient will be independent in comprehensive HEP post discharge from program  - Patient will be able to walk up incline with decreased difficulty and no loss of balance in order to improve functional mobility throughout the community  - Patient will be able to perform sit to stands from softer surfaces such as a couch or bed in order to improvement function with transfers  - Patient will be consistent with program for at least 1 day per week to assist with management of condition and functional independence of their condition        Cut off score   All date taken from [facility] Neuro Section or Rehab Measures      PEREZ Cutoff Scores:  MDC: 5 pts  Falls Risk Cutoff: 40.22/56 DGI Cutoff Scores:  Lian Arevalo al 2011, MDC: 2.9 pts  Laura garcia al 2008, Michelle: Score <19/24   MiniBEST Cutoff Scores:  Ale mckeon 2011, MDC: 5.52 pts  Deirdre Mata 2013, [place]: <19/28 5xSTS Cutoff Scores:  MDC: 2.3 sec  Olegario Chamorro, 2011, Michelle: > 16 sec   TUG Cutoff Scores:  Yoshi Guaman, 2011, MDC: 4.8 sec  Debbi Galeazzi al, 2011, [place]: Meds ON: < 12.21 sec, OFF: 15.5 sec 10 Meter Walk Test Cutoff Scores:  Leeanna Hwang, 2008, MDC: 0.18 m/s  Age Norm Values, Michelle: < 1.0 m/s   ABC Cutoff Scores:  Robin Tarango, 2008, MDC: 13 pts  Yoshi Gunn, MDC: 11.12 pts  Increased risk for falls: < 69% 6 Minute Walk Test Cutoff Scores:  Leeanna Hwang, 2008, MDC: 269 ft  Dixon, 2002, Norm Data Healthy Adults:  61 - 71 years:   M: 200 m      F: 538 m  70 - 79 years:   M: 1 m      F: 200 m  80 - 89 years:   M: 1 m      F: 80 m   PDQ-39 Cutoff Scores:  Flavia mckeon, 2004:  MDC: Mobility (12.24), ADL (16.72), Emotional (14.22), Stigma (21.21), Social Support (21.25), Cognition (21.12), Communication (21.04), Bodily Discomfort (24.48)  Conradoell et al, 2007:  Normative Data: Mobility (49.25), ADL (38.94), Emotional (37.92), Stigma (27.54), Social Support (14.78), Cognition (33.03), Communication (27.99), Bodily Discomfort (40.91) Nilda and Yahr Stages  Stage 0: No S/S  Stage 1: Mild unilateral symptoms  Stage 1.5: Unilateral and axial symptoms  Stage 2: Bilateral symptoms, no balance impairment  Stage 2.5: Mild bilateral symptoms and recovery with pull test  Stage 3: Mild to moderate postural instability, independent  Stage 4: Severe disability, walking or standing unassisted  Stage 5: Bed bound, w/c bound           Subjective Evaluation    History of Present Illness  Mechanism of injury: Patient reports with diagnosis of Parkinson's Disease about 1 month ago via Dr. Wei Fernandez. However he has been experiencing symptoms including tremors since 2019. He takes Sinemet 3x per day.      Primary AD: none  Previous Programs: none    Pain  L shoulder/arm - chronic; currently 3/10     Social Support  Steps to enter house: 2-3 LEIDA   Stairs in house: full flight    Lives with: wife    Employment status: not working  Hand dominance: RHD    Treatments  Previous treatment: Sinemet  Current treatment: PT   Diagnostic Testing: pending       Objective   Gait abnormalities: shuffling gait, decreased/absent arm swing    MDS/UPDRS Part III  - Is the patient on medications for treating symptoms of PD? Yes  - If receiving medication for treatment: ON: typical functional state when receiving medication and having a good response  - Is the patient on Levodopa?  Yes        - If yes, how many minutes since last dose: 120 min  - Speech: 3 - Moderate: Speech is difficulty to understand to the point that some, but not most, sentences are poorly understood  - Facial Expression: 2 - Mild: in addition to decreased eye-blink frequency, masked facies present in the lower face as well, namely fewer movements around the mouth, such as less spontaneous smiling, but lips not parted  - Rigidity:  - Neck: 0 - Normal: No rigidity  - RUE: 0 - Normal: No rigidity  - LUE: 0 - Normal: No rigidity  - RLE: 0 - Normal: No rigidity  - LLE: 0 - Normal: No rigidity  - Finger Tapping:  - R: 3 - Moderate: Any of the following: (a) > 5 interruptions during tapping or at least 1 longer arrest/freeze in ongoing movement, (b) moderate slowing, (c) the amplitude decrements starting after the 1st tap  - L: 2 -Mild: Any of the following: (a) 3-5 interruptions during tapping, (b) mild slowing, (c) the amplitude decrements midway in the 10-tap sequence  - Hand Movements:   - R: 2 - Mild: Any of the following: (a) 3-5 interruptions during the movements, (b) mild slowing, (c) the amplitude decrements midway in the task   - L: 2 - Mild: Any of the following: (a) 3-5 interruptions during the movements, (b) mild slowing, (c) the amplitude decrements midway in the task  - Pronation-Supination Movements of Hands:   - R: 2 - Mild: Any of the following: (a) 3-5 interruptions during the movements, (b) mild slowing, (c) the amplitude decrements midway in the sequence   - L: 2 - Mild: Any of the following: (a) 3-5 interruptions during the movements, (b) mild slowing, (c) the amplitude decrements midway in the sequence  - Toe Tapping:   - R: 2 - Mild: Any of the following: (a) 3-5 interruptions during tapping movements, (b) mild slowing, (c) amplitude decrements midway in the task   - L: 2 - Mild: Any of the following: (a) 3-5 interruptions during tapping movements, (b) mild slowing, (c) amplitude decrements midway in the task  - Leg Agility:   - R: 2 - Mild: Any of the following: (a) 3-5 interruptions during the movements, (b) mild slowness, (c) amplitude decrements midway in the task   - L: 2 - Mild: Any of the following: (a) 3-5 interruptions during the movements, (b) mild slowness, (c) amplitude decrements midway in the task  - Arising from Chair: 1 - Slight: Arising is slower than normal, or may need > 1 attempt, or may need to move forward in the chair.  No use of UE  - Gait: 1 - Slight: Independent walking with minor gait impairment  - Freezing of Gait: 1 - Slight: Freezes on starting, turning, or walking through doorway with a single halt during any of these activities, but then continues smoothly without freezing during straight walking  - Postural Stability: 3 - Moderate: Stands safely, but with absence of postural response; falls if not caught by examiner)  - Posture: 2 - Mild: Definite flexion, scoliosis/leaning to 1 side, but can correct posture to normal posture when asked to do so  - Global Spontaneity of Movement (Body Bradykinesia): 2 - Mild: Mild global slowness and poverty of spontaneous movements  - Postural Tremor of the Hands:   - R: 1 - Slight: Tremor present, but < 1 cm in amplitude   - L: 1 - Slight: Tremor present, but < 1 cm in amplitude  - Kinetic Tremor of the Hands:   - R: 1 - Slight: Tremor present, but < 1 cm in amplitude   - L: 1 - Slight: Tremor present, but < 1 cm in amplitude  - Rest Tremor Amplitude:   - RUE: 1 - Slight: Tremor present, but < 1 cm in amplitude   - LUE: 1 - Slight: Tremor present, but < 1 cm in amplitude   - RLE: 1 - Slight: Tremor present, but < 1 cm in amplitude   - LLE: 1 - Slight: Tremor present, but < 1 cm in amplitude   - Lip/Jaw: 0 - Normal: No tremor  - Constancy of Rest Tremor: 1 - Slight: Tremor at rest is present 0-25% of the entire examination period  - Dyskinesia Impact on Part III Ratings:   - Were dyskinesias (chorea or dystonia) present during examination? No   - If yes, did these movements interfere with your ratings?  No  - Nilda and Yahr Stage: 3.0 - Mild to moderate involvement, some postural instability but physically independent, and needs assistance to recover from pull test          Outcome Measures Initial Eval  8/2/23        5xSTS 23.3 sec, no UE        TUG  - Regular  - Cognitive  - Carry   9.03 sec  NP sec  9.5 sec        MiniBEST 17/28        10 meter 0.96 m/s        6MWT defer ft        ABC defer%        PDQ-39 defer/100        H&Y Stage 3.0        Floor > Stand defer sec                      Precautions: fall risk  Past Medical History:   Diagnosis Date   • Hypertension

## 2023-08-07 ENCOUNTER — OFFICE VISIT (OUTPATIENT)
Facility: CLINIC | Age: 58
End: 2023-08-07
Payer: COMMERCIAL

## 2023-08-07 DIAGNOSIS — G20 PARKINSON'S DISEASE (HCC): Primary | ICD-10-CM

## 2023-08-07 DIAGNOSIS — G25.9 MOVEMENT DISORDER: ICD-10-CM

## 2023-08-07 PROCEDURE — 97112 NEUROMUSCULAR REEDUCATION: CPT

## 2023-08-07 NOTE — PROGRESS NOTES
Daily Note       POC expires Auth Status Total   Visits  Start date  Expiration date PT/OT + Visit Limit? Co-Insurance   10/25/23 Submitted  23  BOMN No                                           Visit/Unit Tracking  AUTH Status: submitted Date 23             Visits  Authed: pending Used eval 1              Remaining                    Neurologist Dr. Yogesh Bailey   Last Visit to Neurologist 8/3/23   Medication Sinemet   Medication Frequency 3x per day   Dosing Times           Today's date: 2023  Patient name: Ever Washburn  : 1965  MRN: [id number]  Referring provider: Dayton Medina MD  Dx:   Encounter Diagnosis     ICD-10-CM    1. Parkinson's disease ([address])  G20       2. Movement disorder  G25.9                      Subjective: Patient reports no new complaints or falls. Objective: See treatment diary below    NMR:   Large Amplitude Exercises  - Seated floor to ceiling: 10 reps, 10 sec holds  - Seated side to side: 10 reps B/L, 10 sec holds  - Forward step: 10 reps B/L  - Lateral step: 10 reps B/L      Assessment: Patient tolerated session well today with introduction of large amplitude exercises to optimize amplitude of his movement for carryover with gait activities. Patient displayed significant bradykinesia and as a result required extended time to complete exercises. Therefore limited number of exercises completed today; should complete remaining daily exercises next visit and issue as HEP at that time. He will continue to benefit from skilled PT in order to maximize function in the short-term and long-term with overall improved postural strength with associated stability and mobility. Plan: Continue per plan of care. Patient to return w/ filled out PDQ-39 next visit.       Outcome Measures Initial Eval  23        5xSTS 23.3 sec, no UE        TUG  - Regular  - Cognitive  - Carry   9.03 sec  NP sec  9.5 sec        MiniBEST         10 meter 0.96 m/s        6MWT defer ft ABC defer%        PDQ-39 defer/100        H&Y Stage 3.0        Floor > Stand defer sec

## 2023-08-10 ENCOUNTER — OFFICE VISIT (OUTPATIENT)
Facility: CLINIC | Age: 58
End: 2023-08-10
Payer: COMMERCIAL

## 2023-08-10 DIAGNOSIS — G25.9 MOVEMENT DISORDER: ICD-10-CM

## 2023-08-10 DIAGNOSIS — G20 PARKINSON'S DISEASE (HCC): Primary | ICD-10-CM

## 2023-08-10 PROCEDURE — 97112 NEUROMUSCULAR REEDUCATION: CPT | Performed by: PHYSICAL THERAPIST

## 2023-08-10 NOTE — PROGRESS NOTES
Daily Note       POC expires Auth Status Total   Visits  Start date  Expiration date PT/OT + Visit Limit? Co-Insurance   10/25/23 Submitted  23  BOMN No                                           Visit/Unit Tracking  AUTH Status: submitted Date 23             Visits  Authed: pending Used eval 1              Remaining                    Neurologist Dr. Ynes Truong   Last Visit to Neurologist 8/3/23   Medication Sinemet   Medication Frequency 3x per day   Dosing Times           Today's date: 8/10/2023  Patient name: Kleber Patel  : 1965  MRN: [id number]  Referring provider: Broderick Stiles MD  Dx:   Encounter Diagnosis     ICD-10-CM    1. Parkinson's disease ([address])  G20       2. Movement disorder  G25.9                      Subjective: Patient reports no new complaints or falls. Arrives 15 min late to session. Objective: See treatment diary below    NMR:   Large Amplitude Exercises  - Seated floor to ceilin reps, 10 sec holds  - Seated side to side: 5 reps B/L, 10 sec holds  - Forward step: 10 reps B/L  - Lateral step: 10 reps B/L  - Backwards step: 10 reps B/L  - rock forward and reach: 10 reps each  - sideways rock and reach: 10 reps each   - sit to stand: 10 reps       Assessment: Patient tolerated session well today with introduction of large amplitude exercises to optimize amplitude of his movement for carryover with gait activities. Modified first 2 seated exercises to 5 reps instead of 10 due to time constraints due to pt arriving late today. No issues or questions with seated exercises or forward and lateral step. Pt had difficulty with backwards step, forward rock and reach, sideways rock and reach - requiring vc's tc's and demo to perform correctly and with appropriate amplitude. Pt given handout with HEP and instructed to perform daily, twice daily if able. Pt verbalized understanding.  He will continue to benefit from skilled PT in order to maximize function in the short-term and long-term with overall improved postural strength with associated stability and mobility. Plan: Continue per plan of care. Patient to return w/ filled out PDQ-39 next visit.       Outcome Measures Initial Eval  8/2/23        5xSTS 23.3 sec, no UE        TUG  - Regular  - Cognitive  - Carry   9.03 sec  NP sec  9.5 sec        MiniBEST 17/28        10 meter 0.96 m/s        6MWT defer ft        ABC defer%        PDQ-39 defer/100        H&Y Stage 3.0        Floor > Stand defer sec

## 2023-08-17 ENCOUNTER — OFFICE VISIT (OUTPATIENT)
Facility: CLINIC | Age: 58
End: 2023-08-17
Payer: COMMERCIAL

## 2023-08-17 DIAGNOSIS — G25.9 MOVEMENT DISORDER: ICD-10-CM

## 2023-08-17 DIAGNOSIS — G20 PARKINSON'S DISEASE (HCC): Primary | ICD-10-CM

## 2023-08-17 PROCEDURE — 97112 NEUROMUSCULAR REEDUCATION: CPT | Performed by: PHYSICAL THERAPIST

## 2023-08-17 NOTE — PROGRESS NOTES
Daily Note       POC expires Auth Status Total   Visits  Start date  Expiration date PT/OT + Visit Limit? Co-Insurance   10/25/23 Submitted  23  BOMN No                                           Visit/Unit Tracking  AUTH Status: submitted Date 8/2/23 8/7 8/10 8/17           Visits  Authed: pending Used eval 1 1 1            Remaining                    Neurologist Dr. Santi Nieves   Last Visit to Neurologist 8/3/23   Medication Sinemet   Medication Frequency 3x per day   Dosing Times           Today's date: 2023  Patient name: Carlee Mukherjee  : 1965  MRN: [id number]  Referring provider: Hazel Anderson MD  Dx:   Encounter Diagnosis     ICD-10-CM    1. Parkinson's disease ([address])  G20       2. Movement disorder  G25.9                      Subjective: Patient arrives 10 min late, no new complaints. Reports doing some of his exercises at home. Objective: See treatment diary below    HR 96 bpm  SpO2 96%    NMR:   Large Amplitude Exercises  - Seated floor to ceiling: 10 reps, 10 sec holds  - Seated side to side: 10 reps B/L, 10 sec holds  - Forward step: 10 reps B/L  - Lateral step: 10 reps B/L  - Backwards step: 10 reps B/L  - rock forward and reach: 10 reps each (use of scarves to encourage large amplitude arm movement)  - sideways rock and reach: 10 reps each (use of scarves to encourage large amplitude arm movement)  - sit to stand: 10 reps   - Large amplitude walking with use of canes to facilitate large amplitude arm swing x 5 min      Walked out to [facility] at end of session without canes to assess for carryover       Assessment: Patient tolerated session well today with introduction of large amplitude exercises to optimize amplitude of his movement for carryover with gait activities. Pt did have 1 LOB during backwards step, although he was able to perform with improved form compared to last session. Observed large amplitude LE movement during forward and lateral step.  He has most difficulty with large amplitude arm movements, which did improve with external cueing (scarves). Pt with decent carryover of arm swing by end of session. He will continue to benefit from skilled PT in order to maximize function in the short-term and long-term with overall improved postural strength with associated stability and mobility. Plan: Continue per plan of care. Patient to return w/ filled out PDQ-39 next visit.       Outcome Measures Initial Eval  8/2/23        5xSTS 23.3 sec, no UE        TUG  - Regular  - Cognitive  - Carry   9.03 sec  NP sec  9.5 sec        MiniBEST 17/28        10 meter 0.96 m/s        6MWT defer ft        ABC defer%        PDQ-39 defer/100        H&Y Stage 3.0        Floor > Stand defer sec

## 2023-08-21 ENCOUNTER — OFFICE VISIT (OUTPATIENT)
Facility: CLINIC | Age: 58
End: 2023-08-21
Payer: COMMERCIAL

## 2023-08-21 DIAGNOSIS — G20 PARKINSON'S DISEASE (HCC): Primary | ICD-10-CM

## 2023-08-21 DIAGNOSIS — G25.9 MOVEMENT DISORDER: ICD-10-CM

## 2023-08-21 PROCEDURE — 97112 NEUROMUSCULAR REEDUCATION: CPT | Performed by: PHYSICAL THERAPIST

## 2023-08-28 ENCOUNTER — OFFICE VISIT (OUTPATIENT)
Facility: CLINIC | Age: 58
End: 2023-08-28
Payer: COMMERCIAL

## 2023-08-28 DIAGNOSIS — G25.9 MOVEMENT DISORDER: ICD-10-CM

## 2023-08-28 DIAGNOSIS — G20 PARKINSON'S DISEASE (HCC): Primary | ICD-10-CM

## 2023-08-28 PROCEDURE — 97112 NEUROMUSCULAR REEDUCATION: CPT

## 2023-08-28 NOTE — PROGRESS NOTES
Daily Note       POC expires Auth Status Total   Visits  Start date  Expiration date PT/OT + Visit Limit? Co-Insurance   10/25/23 Submitted  23 BOMN No                                           Visit/Unit Tracking  AUTH Status: submitted Date 8/2/23 8/7 8/10 8/17 8/22          Visits  Authed: pending Used eval 1 1 1 1           Remaining  6 10 9 8 7              Neurologist Dr. Marlene Ferreira   Last Visit to Neurologist 8/3/23   Medication Sinemet   Medication Frequency 3x per day   Dosing Times           Today's date: 2023  Patient name: Mignon Velasco  : 1965  MRN: [id number]  Referring provider: Arsh Sanchez MD  Dx:   Encounter Diagnosis     ICD-10-CM    1. Parkinson's disease ([address])  G20       2. Movement disorder  G25.9                      Subjective: Patient arrives with no new changes, complaints or falls. Objective: See treatment diary below    NMR:   Large Amplitude Exercises (1# wrist weights, 2# ankle weights)   - Seated floor to ceiling: 10 reps, 10 sec holds + finger flicks   - Seated side to side: 10 reps B/L, 10 sec holds + finger flicks   - Forward step: 10 reps B/L with 6" aneta  - Lateral step: 10 reps B/L with 6" aneta  - Backwards step: 10 reps B/L with 6" aneta   - Rock and reach: 10 reps each  - Sideways rock and reach: 10 reps each  - Sit to stand: 20 reps    - Large amplitude walking: 2 x 300 feet (instruction to march in 2nd set)     Assessment: Patient tolerated session well today with use of large amplitude exercises to optimize amplitude of his movement for carryover with gait activities. Progressed to include distal weighting in addition to aneta activity with good tolerance. He responds best to visual cueing with excellent range, amplitude, and power with sideways rock and reach.  Improvements with amplitude in LEs when ambulating; however overall  He will continue to benefit from skilled PT in order to maximize function in the short-term and long-term with overall improved postural strength with associated stability and mobility. Plan: Continue per plan of care. Progress treatment as tolerated.        Outcome Measures Initial Eval  8/2/23        5xSTS 23.3 sec, no UE        TUG  - Regular  - Cognitive  - Carry   9.03 sec  NP sec  9.5 sec        MiniBEST 17/28        10 meter 0.96 m/s        6MWT defer ft        ABC defer%        PDQ-39 defer/100        H&Y Stage 3.0        Floor > Stand defer sec

## 2023-08-31 ENCOUNTER — OFFICE VISIT (OUTPATIENT)
Facility: CLINIC | Age: 58
End: 2023-08-31
Payer: COMMERCIAL

## 2023-08-31 DIAGNOSIS — G25.9 MOVEMENT DISORDER: ICD-10-CM

## 2023-08-31 DIAGNOSIS — G20 PARKINSON'S DISEASE (HCC): Primary | ICD-10-CM

## 2023-08-31 PROCEDURE — 97112 NEUROMUSCULAR REEDUCATION: CPT | Performed by: PHYSICAL THERAPIST

## 2023-08-31 NOTE — PROGRESS NOTES
Daily Note       POC expires Auth Status Total   Visits  Start date  Expiration date PT/OT + Visit Limit? Co-Insurance   10/25/23 Submitted  23 BOMN No                                           Visit/Unit Tracking  AUTH Status: submitted Date 8/2/23 8/7 8/10 8/17 8/21 8/28 8/31        Visits  Authed: pending Used eval 1 1 1 1 1 1         Remaining  6 10 9 8 7 6 5            Neurologist Dr. Bob Dejesus   Last Visit to Neurologist 8/3/23   Medication Sinemet   Medication Frequency 3x per day   Dosing Times           Today's date: 2023  Patient name: Jones Gottron  : 1965  MRN: [id number]  Referring provider: Michelle Lee MD  Dx:   Encounter Diagnosis     ICD-10-CM    1. Parkinson's disease ([address])  G20       2. Movement disorder  G25.9                      Subjective: Patient arrives with no new changes, complaints or falls. Objective: See treatment diary below    NMR:   Large Amplitude Exercises (1# wrist weights, 2# ankle weights)   - Seated floor to ceiling: 10 reps, 10 sec holds + finger flicks   - Seated side to side: 10 reps B/L, 10 sec holds + finger flicks   - Forward step: 10 reps B/L with 6" aneta  - Lateral step: 10 reps B/L with 6" aneta  - Backwards step: 10 reps B/L with 6" aneta   - Rock and reach: 10 reps each  - Sideways rock and reach: 10 reps each  - Sit to stand: 20 reps    - Large amplitude walking: 2 x 300 feet (instruction to march in 2nd set)     Assessment: Patient tolerated session well today with use of large amplitude exercises to optimize amplitude of his movement for carryover with gait activities. Good form during exercises today however unable to recall from memory. 1 small LOB during backwards step that pt was able to self correct using stepping strategy. Attempted rock and reach on foam today but pt too unstable so performed on firm ground instead. Good arm swing with use of canes, however limited carryover after.  Pt's son came to pick him up and discussed encouraging pt large arm swing when walking. He will continue to benefit from skilled PT in order to maximize function in the short-term and long-term with overall improved postural strength with associated stability and mobility. Plan: Continue per plan of care. Progress treatment as tolerated.        Outcome Measures Initial Eval  8/2/23        5xSTS 23.3 sec, no UE        TUG  - Regular  - Cognitive  - Carry   9.03 sec  NP sec  9.5 sec        MiniBEST 17/28        10 meter 0.96 m/s        6MWT defer ft        ABC defer%        PDQ-39 defer/100        H&Y Stage 3.0        Floor > Stand defer sec

## 2023-09-11 ENCOUNTER — OFFICE VISIT (OUTPATIENT)
Facility: CLINIC | Age: 58
End: 2023-09-11
Payer: COMMERCIAL

## 2023-09-11 DIAGNOSIS — G20 PARKINSON'S DISEASE: Primary | ICD-10-CM

## 2023-09-11 DIAGNOSIS — G25.9 MOVEMENT DISORDER: ICD-10-CM

## 2023-09-11 PROCEDURE — 97112 NEUROMUSCULAR REEDUCATION: CPT | Performed by: PHYSICAL THERAPIST

## 2023-09-11 NOTE — PROGRESS NOTES
Daily Note       POC expires Auth Status Total   Visits  Start date  Expiration date PT/OT + Visit Limit? Co-Insurance   10/25/23 Submitted  23 BOMN No                                           Visit/Unit Tracking  AUTH Status: submitted Date 8/2/23 8/7 8/10 8/17 8/21 8/28 8/31 9/11       Visits  Authed: pending Used eval 1 1 1 1 1 1 1        Remaining  6 10 9 6 7 6 5 4           Neurologist Dr. Renita Nur   Last Visit to Neurologist 8/3/23   Medication Sinemet   Medication Frequency 3x per day   Dosing Times           Today's date: 2023  Patient name: Alicia Whitman  : 1965  MRN: [id number]  Referring provider: Marcus Vides MD  Dx:   Encounter Diagnosis     ICD-10-CM    1. Parkinson's disease ([address])  G20       2. Movement disorder  G25.9                      Subjective: Patient arrives with no new changes, complaints or falls. Objective: See treatment diary below    NMR:   Large Amplitude Exercises (1# wrist weights, 2# ankle weights)   - Seated floor to ceiling: 10 reps, 10 sec holds + finger flicks   - Seated side to side: 10 reps B/L, 10 sec holds + finger flicks   - Forward step: 10 reps B/L with 6" aneta  - Lateral step: 10 reps B/L with 6" aneta  - Backwards step: 10 reps B/L with 6" aneta  - Sit to stand: 20 reps      Assessment: Patient tolerated session well today with use of large amplitude exercises to optimize amplitude of his movement for carryover with gait activities. Patient unable to recall exercises from memory, however was able to demonstrate fair form with minimal verbal and manual cueing for finger flicking and increasing amplitude movements. He had two LOB during backwards step with aneta, however was able to maintain balance. Was not able to complete all exercises secondary to patient needing to use the bathroom twice during the session.  He will continue to benefit from skilled PT in order to maximize function in the short-term and long-term with overall improved postural strength with associated stability and mobility. Plan: Continue per plan of care. Progress treatment as tolerated.        Outcome Measures Initial Eval  8/2/23        5xSTS 23.3 sec, no UE        TUG  - Regular  - Cognitive  - Carry   9.03 sec  NP sec  9.5 sec        MiniBEST 17/28        10 meter 0.96 m/s        6MWT defer ft        ABC defer%        PDQ-39 defer/100        H&Y Stage 3.0        Floor > Stand defer sec

## 2023-09-28 ENCOUNTER — OFFICE VISIT (OUTPATIENT)
Facility: CLINIC | Age: 58
End: 2023-09-28
Payer: COMMERCIAL

## 2023-09-28 DIAGNOSIS — G25.9 MOVEMENT DISORDER: ICD-10-CM

## 2023-09-28 DIAGNOSIS — G20.A1 PARKINSON'S DISEASE: Primary | ICD-10-CM

## 2023-09-28 PROCEDURE — 97530 THERAPEUTIC ACTIVITIES: CPT | Performed by: PHYSICAL THERAPIST

## 2023-09-28 NOTE — PROGRESS NOTES
PT Re-Evaluation            POC expires Auth Status Total   Visits  Start date  Expiration date PT/OT + Visit Limit? Co-Insurance   10/25/23 Submitted  23 BOMN No                                           Visit/Unit Tracking  AUTH Status: submitted Date 8/2/23 8/7 8/10 8/17 8/21 8/28 8/31 9/11 9/28      Visits  Authed: pending Used eval 1 1 1 1 1 1 1 1       Remaining  6 10 9 8 7 6 5 4 3                Neurologist Dr. Coleman Vance   Last Visit to Neurologist 8/3/23   Medication Sinemet   Medication Frequency 3x per day   Dosing Times               Today's date: 2023  Patient name: Jovanna Louie  : 1965  MRN: [id number]  Referring provider: Jason Ocampo MD  Dx:   Encounter Diagnosis     ICD-10-CM    1. Parkinson's disease  G20       2. Movement disorder  G25.9                 Assessment  Assessment details: Patient is a 60 y.o. Male who presents to skilled outpatient PT with recent diagonsis of Parkinson's disease. He presents with bradykinesia, shuffling of gait, freezing, and imbalance which prevent him from functioning at his optimal level. He has met 3 of 4 short-term goals thus far. He is now deemed low fall risk per 5x STS and mini Best. He reports no falls. He demonstrated improved lateral stepping strategy today although backwards is still absent requiring therapist to catch him to prevent fall. He presents with the following symptoms that are consistent with Nilda and Yahr stage 3: mild to moderate postural instability. Per research provided by [employer], patient will benefit from skilled outpatient PT services to improve and maximize his/her function, to reduce risk for falls and potential injuries associated with falls, reduce healthcare costs via hospitalization, and reduce patient and national healthcare costs.  In early stages of Parkinson's Disease, research indicates that intensive physical exercise can improve patient's motor control and assist in slowing the disease progression as a neuroprotective agent. Patient will benefit from skilled outpatient PT in order to maximize function in the short-term and long-term with overall improved postural strength with associated stability and mobility. Impairments: Abnormal coordination, Abnormal gait, Abnormal muscle tone, Abnormal or restricted ROM, Activity intolerance, Impaired balance, Impaired physical strength, Lacks appropriate HEP, Poor posture, Poor body mechanics, Pain with function, Safety issue, Weight-bearing intolerance, Abnormal movement, Difficulty understanding, Abnormal muscle firing  Understanding of Dx/Px/POC: Good  Prognosis: Good      Patient verbalized understanding of POC. Please contact me if you have any questions or recommendations. Thank you for the referral and the opportunity to share in [facility] care.            Plan  Plan details: skilled PT focused on large amplitude movement training; perform 6MWT, supine to stand test, and PDQ 39 next visit   Program: LSVT BIG  Planned modality interventions: Biofeedback, Cryotherapy, TENS, Thermotherapy  Planned therapy interventions: Abdominal trunk stabilization, ADL training, Balance, Balance/WB training, Breathing training, Body mechanics training, Coordination, Functional ROM exercises, Gait training, HEP, Joint Mobilization, Manual Therapy, Smith taping, Motor coordination training, Neuromuscular re-education, Patient education, Postural training, Strengthening, Stretching, Therapeutic activities, Therapeutic exercises, Therapeutic training, Transfer training, Activity modification, Work reintegration  Frequency: 2x/wk  Duration in weeks: 12  Plan of Care beginning date: 8/2/23  Plan of Care expiration date: 12 weeks - 10/25/23  Treatment plan discussed with: Patient and Family         Goals  Short Term Goals (4 weeks):  - Patient will improve 5 STS by the [place] of 2.3 sec indicating an improvement in strength and decreased challenge with transfers- MET  - Patient will improve 6 MWT by the [facility] of 269 ft indicating an improvement in cardiovascular endurance in order to maximize function with functional mobility throughout the community- not met (performed today)  - Patient will improve MiniBESTest score by MDC of 5.52 points indicating an improvement with dynamic balance in order to further decrease risk of falls with functional tasks- MET  - Patient will be independent in basic HEP in order to manage condition at home- MET    Long Term Goals (12 weeks):  - Patient will score a low risk for falls 2/4 fall risks measures  - Patient will score age norm values for 1/2 endurance measures  - Patient will be able to ambulate on uneven surfaces with 50% reduction in near falls to increase safety with community mobility  - Patient will be able to perform a floor transfer without physical assistance to assist with fall recovery at home and in the community  - Patient will be independent in comprehensive HEP post discharge from program  - Patient will be able to walk up incline with decreased difficulty and no loss of balance in order to improve functional mobility throughout the community  - Patient will be able to perform sit to stands from softer surfaces such as a couch or bed in order to improvement function with transfers  - Patient will be consistent with program for at least 1 day per week to assist with management of condition and functional independence of their condition        Cut off score   All date taken from [facility] Neuro Section or Rehab Measures      PEREZ Cutoff Scores:  MDC: 5 pts  Falls Risk Cutoff: 40.22/56 DGI Cutoff Scores:  Eduardo Rodriguez al 2011, MDC: 2.9 pts  Laura et al 2008, Michelle: Score <19/24   MiniBEST Cutoff Scores:  Mia mckeon 2011, MDC: 5.52 pts  Sarah Worley 2013, [place]: <19/28 5xSTS Cutoff Scores:  MDC: 2.3 sec  Georgette Klein et al, 2011, Michelle: > 16 sec   TUG Cutoff Scores:  Martínez Wilson et al, 2011, MDC: 4.8 sec  Sammy Aschoff al, 2011, [place]: Meds ON: < 12.21 sec, OFF: 15.5 sec 10 Meter Walk Test Cutoff Scores:  Rose Mary Rios, 2008, MDC: 0.18 m/s  Age Norm Values, Michelle: < 1.0 m/s   ABC Cutoff Scores:  Kandy Rojas, 2008, MDC: 13 pts  [facility], MDC: 11.12 pts  Increased risk for falls: < 69% 6 Minute Walk Test Cutoff Scores:  Rose Mary Rios, 2008, [facility]: 269 ft  [place]ixon, 2002, Norm Data Healthy Adults:  61 - 71 years:   M: 200 m      F: 1 m  70 - 79 years:   M: 1 m      F: 200 m  80 - 89 years:   M: 1 m      F: 80 m   PDQ-39 Cutoff Scores:  Author Thad mckeon, 2004:  MDC: Mobility (12.24), ADL (16.72), Emotional (14.22), Stigma (21.21), Social Support (21.25), Cognition (21.12), Communication (21.04), Bodily Discomfort (24.48)  Carina et al, 2007:  Normative Data: Mobility (49.25), ADL (38.94), Emotional (37.92), Stigma (27.54), Social Support (14.78), Cognition (33.03), Communication (27.99), Bodily Discomfort (40.91) Nilda and Yahr Stages  Stage 0: No S/S  Stage 1: Mild unilateral symptoms  Stage 1.5: Unilateral and axial symptoms  Stage 2: Bilateral symptoms, no balance impairment  Stage 2.5: Mild bilateral symptoms and recovery with pull test  Stage 3: Mild to moderate postural instability, independent  Stage 4: Severe disability, walking or standing unassisted  Stage 5: Bed bound, w/c bound           Subjective Evaluation    History of Present Illness  Mechanism of injury: Patient reports with diagnosis of Parkinson's Disease about 1 month ago via Dr. Renita Nur. However he has been experiencing symptoms including tremors since 2019. He takes Sinemet 3x per day. Update 9/28: Pt reports "I feel better" since starting PT. He is compliant with exercises at home, pt and wife both report noticing improvements in his walking.      Primary AD: none  Previous Programs: none    Pain  L shoulder/arm - chronic; currently 3/10     Social Support  Steps to enter house: 2-3 ELIDA   Stairs in house: full flight    Lives with: wife    Employment status: not working  Hand dominance: RHD    Treatments  Previous treatment: Sinemet  Current treatment: PT   Diagnostic Testing: pending       Objective   Gait abnormalities: shuffling gait, decreased/absent arm swing    MDS/UPDRS Part III  - Is the patient on medications for treating symptoms of PD? Yes  - If receiving medication for treatment: ON: typical functional state when receiving medication and having a good response  - Is the patient on Levodopa?  Yes        - If yes, how many minutes since last dose: 120 min  - Speech: 3 - Moderate: Speech is difficulty to understand to the point that some, but not most, sentences are poorly understood  - Facial Expression: 2 - Mild: in addition to decreased eye-blink frequency, masked facies present in the lower face as well, namely fewer movements around the mouth, such as less spontaneous smiling, but lips not parted  - Rigidity:  - Neck: 0 - Normal: No rigidity  - RUE: 0 - Normal: No rigidity  - LUE: 0 - Normal: No rigidity  - RLE: 0 - Normal: No rigidity  - LLE: 0 - Normal: No rigidity  - Finger Tapping:  - R: 3 - Moderate: Any of the following: (a) > 5 interruptions during tapping or at least 1 longer arrest/freeze in ongoing movement, (b) moderate slowing, (c) the amplitude decrements starting after the 1st tap  - L: 2 -Mild: Any of the following: (a) 3-5 interruptions during tapping, (b) mild slowing, (c) the amplitude decrements midway in the 10-tap sequence  - Hand Movements:   - R: 2 - Mild: Any of the following: (a) 3-5 interruptions during the movements, (b) mild slowing, (c) the amplitude decrements midway in the task   - L: 2 - Mild: Any of the following: (a) 3-5 interruptions during the movements, (b) mild slowing, (c) the amplitude decrements midway in the task  - Pronation-Supination Movements of Hands:   - R: 2 - Mild: Any of the following: (a) 3-5 interruptions during the movements, (b) mild slowing, (c) the amplitude decrements midway in the sequence   - L: 2 - Mild: Any of the following: (a) 3-5 interruptions during the movements, (b) mild slowing, (c) the amplitude decrements midway in the sequence  - Toe Tapping:   - R: 2 - Mild: Any of the following: (a) 3-5 interruptions during tapping movements, (b) mild slowing, (c) amplitude decrements midway in the task   - L: 2 - Mild: Any of the following: (a) 3-5 interruptions during tapping movements, (b) mild slowing, (c) amplitude decrements midway in the task  - Leg Agility:   - R: 2 - Mild: Any of the following: (a) 3-5 interruptions during the movements, (b) mild slowness, (c) amplitude decrements midway in the task   - L: 2 - Mild: Any of the following: (a) 3-5 interruptions during the movements, (b) mild slowness, (c) amplitude decrements midway in the task  - Arising from Chair: 1 - Slight: Arising is slower than normal, or may need > 1 attempt, or may need to move forward in the chair.  No use of UE  - Gait: 1 - Slight: Independent walking with minor gait impairment  - Freezing of Gait: 1 - Slight: Freezes on starting, turning, or walking through doorway with a single halt during any of these activities, but then continues smoothly without freezing during straight walking  - Postural Stability: 3 - Moderate: Stands safely, but with absence of postural response; falls if not caught by examiner)  - Posture: 2 - Mild: Definite flexion, scoliosis/leaning to 1 side, but can correct posture to normal posture when asked to do so  - Global Spontaneity of Movement (Body Bradykinesia): 2 - Mild: Mild global slowness and poverty of spontaneous movements  - Postural Tremor of the Hands:   - R: 1 - Slight: Tremor present, but < 1 cm in amplitude   - L: 1 - Slight: Tremor present, but < 1 cm in amplitude  - Kinetic Tremor of the Hands:   - R: 1 - Slight: Tremor present, but < 1 cm in amplitude   - L: 1 - Slight: Tremor present, but < 1 cm in amplitude  - Rest Tremor Amplitude:   - RUE: 1 - Slight: Tremor present, but < 1 cm in amplitude   - LUE: 1 - Slight: Tremor present, but < 1 cm in amplitude   - RLE: 1 - Slight: Tremor present, but < 1 cm in amplitude   - LLE: 1 - Slight: Tremor present, but < 1 cm in amplitude   - Lip/Jaw: 0 - Normal: No tremor  - Constancy of Rest Tremor: 1 - Slight: Tremor at rest is present 0-25% of the entire examination period  - Dyskinesia Impact on Part III Ratings:   - Were dyskinesias (chorea or dystonia) present during examination? No   - If yes, did these movements interfere with your ratings?  No  - Nilda and Yahr Stage: 3.0 - Mild to moderate involvement, some postural instability but physically independent, and needs assistance to recover from pull test          Outcome Measures Initial Eval  8/2/23 Re-eval  9/28/23       5xSTS 23.3 sec, no UE 11.87 sec  No UE       TUG  - Regular  - Cognitive  - Carry   9.03 sec  NP sec  9.5 sec   9.47 sec  13.18 sec  9.24 sec       MiniBEST 17/28 22/28       10 meter 0.96 m/s 1.1 m/s       6MWT defer ft 1100 ft        ABC defer%        PDQ-39 defer/100        H&Y Stage 3.0        Floor > Stand defer sec                      Precautions: fall risk  Past Medical History:   Diagnosis Date   • Hypertension

## 2023-10-09 ENCOUNTER — OFFICE VISIT (OUTPATIENT)
Facility: CLINIC | Age: 58
End: 2023-10-09
Payer: COMMERCIAL

## 2023-10-09 DIAGNOSIS — G25.9 MOVEMENT DISORDER: ICD-10-CM

## 2023-10-09 DIAGNOSIS — G20.A1 PARKINSON'S DISEASE, UNSPECIFIED WHETHER DYSKINESIA PRESENT, UNSPECIFIED WHETHER MANIFESTATIONS FLUCTUATE: Primary | ICD-10-CM

## 2023-10-09 PROCEDURE — 97112 NEUROMUSCULAR REEDUCATION: CPT

## 2023-10-09 NOTE — PROGRESS NOTES
Daily Note       POC expires Auth Status Total   Visits  Start date  Expiration date PT/OT + Visit Limit? Co-Insurance   10/25/23 Submitted  23 BOMN No                                           Visit/Unit Tracking  AUTH Status: submitted Date 8/2/23 8/7 8/10 8/17 8/21 8/28 8/31 9/11 9/28 10/9     Visits  Authed: pending Used eval 1 1 1 1 1 1 1 1 1      Remaining  11 10 9 8 7 6 5 4 3 2         Neurologist Dr. Xavier Gómez   Last Visit to Neurologist 8/3/23   Medication Sinemet   Medication Frequency 3x per day   Dosing Times           Today's date: 10/9/2023  Patient name: Ramone Garcia  : 1965  MRN: [id number]  Referring provider: Jeannie Moore MD  Dx:   Encounter Diagnosis     ICD-10-CM    1. Parkinson's disease, unspecified whether dyskinesia present, unspecified whether manifestations fluctuate  G20. A1       2. Movement disorder  G25.9                      Subjective: Patient arrives approximately 15 minutes late and states "I'm feeling good today". Objective: See treatment diary below    NMR:   Large Amplitude Exercises (2# wrist weights, 3# ankle weights)   - Seated floor to ceiling: 10 reps, 10 sec holds + finger flicks   - Seated side to side: 10 reps B/L, 10 sec holds + finger flicks   - Forward step: 10 reps B/L with 9" aneta  - Lateral step: 10 reps B/L with 9" aneta, small river rocks on each side  - Backwards step: 10 reps B/L with 9" aneta, one small river rock  - Sit to stand w/ 5.5# TT: 20 reps    Assessment: Patient tolerated treatment session well today with use of large amplitude exercises to optimize amplitude of his movement for carryover with gait activities. Progressed external resistance today to further challenge somatosensory awareness. He required frequent verbal cues to maintain amplitude of speech.  Introduced river rocks to challenge patients dynamic balance; he demonstrated appropriate hip and ankle strategies to maintain balance with most difficulty during backwards stepping. He will continue to benefit from skilled PT in order to maximize function in the short-term and long-term with overall improved postural strength with associated stability and mobility. Plan: Continue per plan of care. Progress treatment as tolerated.        Outcome Measures Initial Eval  8/2/23        5xSTS 23.3 sec, no UE        TUG  - Regular  - Cognitive  - Carry   9.03 sec  NP sec  9.5 sec        MiniBEST 17/28        10 meter 0.96 m/s        6MWT defer ft        ABC defer%        PDQ-39 defer/100        H&Y Stage 3.0        Floor > Stand defer sec

## 2024-03-18 ENCOUNTER — OFFICE VISIT (OUTPATIENT)
Facility: CLINIC | Age: 59
End: 2024-03-18
Payer: COMMERCIAL

## 2024-03-18 DIAGNOSIS — G25.9 MOVEMENT DISORDER: ICD-10-CM

## 2024-03-18 DIAGNOSIS — G20.A1 PARKINSON'S DISEASE, UNSPECIFIED WHETHER DYSKINESIA PRESENT, UNSPECIFIED WHETHER MANIFESTATIONS FLUCTUATE: Primary | ICD-10-CM

## 2024-03-18 PROCEDURE — 97164 PT RE-EVAL EST PLAN CARE: CPT

## 2024-03-18 NOTE — PROGRESS NOTES
PT Re-Evaluation          POC expires Auth Status Total   Visits  Start date  Expiration date PT/OT + Visit Limit? Co-Insurance   24 Submitted   3/18/24  BOMN No                                           Visit/Unit Tracking  AUTH Status: submitted Date 3/18/24              Visits  Authed: pending Used eval               Remaining                    Neurologist Dr. Carvajal   Last Visit to Neurologist 8/3/23   Medication Sinemet   Medication Frequency 3x per day   Dosing Times               Today's date: 3/18/2024  Patient name: Vasile Matthews  : 1965  MRN: [id number]  Referring provider: Kt Jain*  Dx:   Encounter Diagnosis     ICD-10-CM    1. Parkinson's disease, unspecified whether dyskinesia present, unspecified whether manifestations fluctuate  G20.A1       2. Movement disorder  G25.9                   Assessment  Assessment details: Patient is a 59 y.o. Male who presents to skilled outpatient PT with diagnosis of Parkinson's disease. Patient had been a patient of the balance center in  (stopped due to transportation issues). Patient returns with reports of recently being able to increase his activity level and he has experienced no recent falls. Patient presents today with significant improvements from his baseline in 2023. He demonstrates 5x STS and TUG within age norms. Gait speed of 1.07 m/s deems him an unlimited community ambulator; however this speed is insufficient to safely cross a street in the community. Patient's miniBEST score of 24/28 deems him at low risk of falls; notably this score is significantly higher than score of 17/28 last year. Patient's primary goal is to maximize his activity level; therefore plan to focus 1-2 months of 1x per week focused on establishing comprehensive exercise program for independence upon discharge with planned follow-up 3-6 months post-discharge to determine efficacy. Patient in agreement with plan. Per research provided by [employer],  patient will benefit from skilled outpatient PT services to improve and maximize his/her function, to reduce risk for falls and potential injuries associated with falls, reduce healthcare costs via hospitalization, and reduce patient and national healthcare costs. In early stages of Parkinson's Disease, research indicates that intensive physical exercise can improve patient's motor control and assist in slowing the disease progression as a neuroprotective agent. Patient will benefit from skilled outpatient PT in order to maximize function in the short-term and long-term with overall improved postural strength with associated stability and mobility.      Impairments: Abnormal coordination, Abnormal gait, Abnormal muscle tone, Abnormal or restricted ROM, Activity intolerance, Impaired balance, Impaired physical strength, Lacks appropriate HEP, Poor posture, Poor body mechanics, Pain with function, Safety issue, Weight-bearing intolerance, Abnormal movement, Difficulty understanding, Abnormal muscle firing  Understanding of Dx/Px/POC: Good  Prognosis: Good    Patient verbalized understanding of POC.    Please contact me if you have any questions or recommendations. Thank you for the referral and the [facility]      Plan  Plan details: skilled PT focused on establishing comprehensive HEP to promote active lifestyle; next visit please complete 6MWT and UPDRS Part III  Planned modality interventions: Biofeedback, Cryotherapy, TENS, Thermotherapy  Planned therapy interventions: Abdominal trunk stabilization, ADL training, Balance, Balance/WB training, Breathing training, Body mechanics training, Coordination, Functional ROM exercises, Gait training, HEP, Joint Mobilization, Manual Therapy, Smith taping, Motor coordination training, Neuromuscular re-education, Patient education, Postural training, Strengthening, Stretching, Therapeutic activities, Therapeutic exercises, Therapeutic  training, Transfer training, Activity modification, Work reintegration  Frequency: 1x/wk  Duration in weeks: 8  Plan of Care beginning date: 3/18/24  Plan of Care expiration date: 8 weeks - 5/13/24  Treatment plan discussed with: Patient          Goals  Short Term Goals (4 weeks):  - Patient will improve 5 STS by the MDC of 2.3 sec indicating an improvement in strength and decreased challenge with transfers  - Patient will be independent in basic HEP in order to manage condition at home  - Patient will complete 6MWT to assess cardiovascular endurance    Long Term Goals (8 weeks):  - Patient will be independent with comprehensive exercise program  - Patient will be able to ambulate on uneven surfaces with 50% reduction in near falls to increase safety with community mobility  - Patient will be able to perform a floor transfer without physical assistance to assist with fall recovery at home and in the community  - Patient will be able to walk up incline with decreased difficulty and no loss of balance in order to improve functional mobility throughout the community  - Patient will be able to perform sit to stands from softer surfaces such as a couch or bed in order to improvement function with transfers      Cut off score   All date taken from [facility] Section or Rehab Measures      PEREZ Cutoff Scores:  MDC: 5 pts  Falls Risk Cutoff: 40.22/56 DGI Cutoff Scores:  Flowers et al 2011, MDC: 2.9 pts  Laura et al 2008, Michelle: Score <19/24   MiniBEST Cutoff Scores:  Angel et al 2011, MDC: 5.52 pts  Shailesh 2013, Michelle: <19/28 5xSTS Cutoff Scores:  MDC: 2.3 sec  Kunal et al, 2011, Michelle: > 16 sec   TUG Cutoff Scores:  Beau Zarate al, 2011, MDC: 4.8 sec  Violet et al, 2011, Fallers: Meds ON: < 12.21 sec, OFF: 15.5 sec 10 Meter Walk Test Cutoff Scores:  Brittaney, 2008, MDC: 0.18 m/s  Age Norm Values, Michelle: < 1.0 m/s   ABC Cutoff Scores:  Simi, 2008, MDC: 13 pts  Beau Arreguin, MDC:  "11.12 pts  Increased risk for falls: < 69% 6 Minute Walk Test Cutoff Scores:  Luis F and Alannah, 2008, MDC: 269 ft  Luis F et al, 2002, Norm Data Healthy Adults:  60 - 69 years:   M: 572 m      F: 538 m  70 - 79 years:   M: 527 m      F: 471 m  80 - 89 years:   M: 417 m      F: 392 m   PDQ-39 Cutoff Scores:  Kala, 2004:  MDC: Mobility (12.24), ADL (16.72), Emotional (14.22), Stigma (21.21), Social Support (21.25), Cognition (21.12), Communication (21.04), Bodily Discomfort (24.48)  Carina et al, 2007:  Normative Data: Mobility (49.25), ADL (38.94), Emotional (37.92), Stigma (27.54), Social Support (14.78), Cognition (33.03), Communication (27.99), Bodily Discomfort (40.91) Nilda and Yahr Stages  Stage 0: No S/S  Stage 1: Mild unilateral symptoms  Stage 1.5: Unilateral and axial symptoms  Stage 2: Bilateral symptoms, no balance impairment  Stage 2.5: Mild bilateral symptoms and recovery with pull test  Stage 3: Mild to moderate postural instability, independent  Stage 4: Severe disability, walking or standing unassisted  Stage 5: Bed bound, w/c bound           Subjective Evaluation    History of Present Illness  Mechanism of injury: Patient reports with diagnosis of Parkinson's Disease about 1 month ago via Dr. Carvajal. However he has been experiencing symptoms including tremors since 2019. He takes Sinemet 3x per day.     Update 3/18/24: Patient reports he has been more active recently. He reports no new falls. He reports he feels he is walking better and \"shaking\" less. He continues to take Sinemet 3x per day. His goal is to return to his prior level of activity.     Primary AD: none  Previous Programs: none    Pain  L shoulder/arm - chronic; currently 5-6/10     Social Support  Steps to enter house: 2-3 ELIDA   Stairs in house: full flight    Lives with: wife    Employment status: not working  Hand dominance: RHD    Treatments  Previous treatment: Sinemet  Current treatment: PT   Diagnostic Testing: " pending       Objective   BP: 128/72 mmHg    Gait abnormalities: shuffling gait, decreased/absent arm swing      Outcome Measures Initial Eval  8/2/23 Re-Evaluation  3/18/24       5xSTS 23.3 sec, no UE 9.29 sec, no UE       TUG  - Regular  - Cognitive  - Carry   9.03 sec  NP sec  9.5 sec   9.1 sec  8.25 sec   7.0 sec         MiniBEST 17/28 24/28       10 meter 0.96 m/s 1.07 m/s        6MWT defer ft        ABC defer%        PDQ-39 defer/100        H&Y Stage 3.0        Floor > Stand defer sec                      Precautions: fall risk  Past Medical History:   Diagnosis Date    Hypertension

## 2024-03-18 NOTE — LETTER
2024    Sharla Carvajal MD  [address] [place] [id number]    Patient: Vasile Matthews   YOB: 1965   Date of Visit: 3/18/2024     Encounter Diagnosis     ICD-10-CM    1. Parkinson's disease, unspecified whether dyskinesia present, unspecified whether manifestations fluctuate  G20.A1       2. Movement disorder  G25.9           Dear Dr. Carvajal:    Thank you for your recent referral of Vasile Matthews. Please review the attached evaluation summary from Vasile's recent visit.     Please verify that you agree with the plan of care by signing the attached order.     If you have any questions or concerns, please do not hesitate to call.     I sincerely appreciate the opportunity to share in the care of one of your patients and hope to have another opportunity to work with you in the near future.       Sincerely,    Miranda Balderas, PT      Referring Provider:      I certify that I have read the below Plan of Care and certify the need for these services furnished under this plan of treatment while under my care.                    Sharla Carvajal MD  [address] [place] [id number]  Via Fax: [phone]          PT Re-Evaluation          POC expires Auth Status Total   Visits  Start date  Expiration date PT/OT + Visit Limit? Co-Insurance   24 Submitted   3/18/24  BOMN No                                           Visit/Unit Tracking  AUTH Status: submitted Date 3/18/24              Visits  Authed: pending Used eval               Remaining                    Neurologist Dr. Carvajal   Last Visit to Neurologist 8/3/23   Medication Sinemet   Medication Frequency 3x per day   Dosing Times               Today's date: 3/18/2024  Patient name: Vasile Matthews  : 1965  MRN: [id number]  Referring provider: Kt Jain*  Dx:   Encounter Diagnosis     ICD-10-CM    1. Parkinson's disease, unspecified whether dyskinesia present, unspecified whether manifestations  fluctuate  G20.A1       2. Movement disorder  G25.9                   Assessment  Assessment details: Patient is a 59 y.o. Male who presents to skilled outpatient PT with diagnosis of Parkinson's disease. Patient had been a patient of the balance center in 2023 (stopped due to transportation issues). Patient returns with reports of recently being able to increase his activity level and he has experienced no recent falls. Patient presents today with significant improvements from his baseline in August 2023. He demonstrates 5x STS and TUG within age norms. Gait speed of 1.07 m/s deems him an unlimited community ambulator; however this speed is insufficient to safely cross a street in the community. Patient's miniBEST score of 24/28 deems him at low risk of falls; notably this score is significantly higher than score of 17/28 last year. Patient's primary goal is to maximize his activity level; therefore plan to focus 1-2 months of 1x per week focused on establishing comprehensive exercise program for independence upon discharge with planned follow-up 3-6 months post-discharge to determine efficacy. Patient in agreement with plan. Per research provided by [employer], patient will benefit from skilled outpatient PT services to improve and maximize his/her function, to reduce risk for falls and potential injuries associated with falls, reduce healthcare costs via hospitalization, and reduce patient and national healthcare costs. In early stages of Parkinson's Disease, research indicates that intensive physical exercise can improve patient's motor control and assist in slowing the disease progression as a neuroprotective agent. Patient will benefit from skilled outpatient PT in order to maximize function in the short-term and long-term with overall improved postural strength with associated stability and mobility.      Impairments: Abnormal coordination, Abnormal gait, Abnormal muscle tone, Abnormal or restricted ROM, Activity  intolerance, Impaired balance, Impaired physical strength, Lacks appropriate HEP, Poor posture, Poor body mechanics, Pain with function, Safety issue, Weight-bearing intolerance, Abnormal movement, Difficulty understanding, Abnormal muscle firing  Understanding of Dx/Px/POC: Good  Prognosis: Good    Patient verbalized understanding of POC.    Please contact me if you have any questions or recommendations. Thank you for the referral and the opportunity to share in Vasile Matthews's care.      Plan  Plan details: skilled PT focused on establishing comprehensive HEP to promote active lifestyle; next visit please complete 6MWT and UPDRS Part III  Planned modality interventions: Biofeedback, Cryotherapy, TENS, Thermotherapy  Planned therapy interventions: Abdominal trunk stabilization, ADL training, Balance, Balance/WB training, Breathing training, Body mechanics training, Coordination, Functional ROM exercises, Gait training, HEP, Joint Mobilization, Manual Therapy, Smith taping, Motor coordination training, Neuromuscular re-education, Patient education, Postural training, Strengthening, Stretching, Therapeutic activities, Therapeutic exercises, Therapeutic training, Transfer training, Activity modification, Work reintegration  Frequency: 1x/wk  Duration in weeks: 8  Plan of Care beginning date: 3/18/24  Plan of Care expiration date: 8 weeks - 5/13/24  Treatment plan discussed with: Patient          Goals  Short Term Goals (4 weeks):  - Patient will improve 5 STS by the MDC of 2.3 sec indicating an improvement in strength and decreased challenge with transfers  - Patient will be independent in basic HEP in order to manage condition at home  - Patient will complete 6MWT to assess cardiovascular endurance    Long Term Goals (8 weeks):  - Patient will be independent with comprehensive exercise program  - Patient will be able to ambulate on uneven surfaces with 50% reduction in near falls to increase safety with community  mobility  - Patient will be able to perform a floor transfer without physical assistance to assist with fall recovery at home and in the community  - Patient will be able to walk up incline with decreased difficulty and no loss of balance in order to improve functional mobility throughout the community  - Patient will be able to perform sit to stands from softer surfaces such as a couch or bed in order to improvement function with transfers      Cut off score   All date taken from [facility] Neuro Section or Rehab Measures      PEREZ Cutoff Scores:  MDC: 5 pts  Falls Risk Cutoff: 40.22/56 DGI Cutoff Scores:  Lionel et al 2011, MDC: 2.9 pts  Laura et al 2008, Michelle: Score <19/24   MiniBEST Cutoff Scores:  Angel et al 2011, MDC: 5.52 pts  Shalom and Joselyn 2013, Michelle: <19/28 5xSTS Cutoff Scores:  MDC: 2.3 sec  Kunal et al, 2011, Michelle: > 16 sec   TUG Cutoff Scores:  Beau Zarate al, 2011, MDC: 4.8 sec  Violet et al, 2011, Fallers: Meds ON: < 12.21 sec, OFF: 15.5 sec 10 Meter Walk Test Cutoff Scores:  Brittaney, 2008, MDC: 0.18 m/s  Age Norm Values, Michelle: < 1.0 m/s   ABC Cutoff Scores:  Simi, 2008, MDC: 13 pts  Beau Arreguin, MDC: 11.12 pts  Increased risk for falls: < 69% 6 Minute Walk Test Cutoff Scores:  Brittaney, 2008, MDC: 269 ft  Luis F et al, 2002, Norm Data Healthy Adults:  60 - 69 years:   M: 572 m      F: 538 m  70 - 79 years:   M: 527 m      F: 471 m  80 - 89 years:   M: 417 m      F: 392 m   PDQ-39 Cutoff Scores:  Janki et al, 2004:  MDC: Mobility (12.24), ADL (16.72), Emotional (14.22), Stigma (21.21), Social Support (21.25), Cognition (21.12), Communication (21.04), Bodily Discomfort (24.48)  Carina et al, 2007:  Normative Data: Mobility (49.25), ADL (38.94), Emotional (37.92), Stigma (27.54), Social Support (14.78), Cognition (33.03), Communication (27.99), Bodily Discomfort (40.91) Nilda and Yahr Stages  Stage 0: No S/S  Stage 1: Mild unilateral symptoms  Stage  "1.5: Unilateral and axial symptoms  Stage 2: Bilateral symptoms, no balance impairment  Stage 2.5: Mild bilateral symptoms and recovery with pull test  Stage 3: Mild to moderate postural instability, independent  Stage 4: Severe disability, walking or standing unassisted  Stage 5: Bed bound, w/c bound           Subjective Evaluation    History of Present Illness  Mechanism of injury: Patient reports with diagnosis of Parkinson's Disease about 1 month ago via Dr. Carvajal. However he has been experiencing symptoms including tremors since 2019. He takes Sinemet 3x per day.     Update 3/18/24: Patient reports he has been more active recently. He reports no new falls. He reports he feels he is walking better and \"shaking\" less. He continues to take Sinemet 3x per day. His goal is to return to his prior level of activity.     Primary AD: none  Previous Programs: none    Pain  L shoulder/arm - chronic; currently 5-6/10     Social Support  Steps to enter house: 2-3 ELIDA   Stairs in house: full flight    Lives with: wife    Employment status: not working  Hand dominance: RHD    Treatments  Previous treatment: Sinemet  Current treatment: PT   Diagnostic Testing: pending       Objective   BP: 128/72 mmHg    Gait abnormalities: shuffling gait, decreased/absent arm swing      Outcome Measures Initial Eval  8/2/23 Re-Evaluation  3/18/24       5xSTS 23.3 sec, no UE 9.29 sec, no UE       TUG  - Regular  - Cognitive  - Carry   9.03 sec  NP sec  9.5 sec   9.1 sec  8.25 sec   7.0 sec         MiniBEST 17/28 24/28       10 meter 0.96 m/s 1.07 m/s        6MWT defer ft        ABC defer%        PDQ-39 defer/100        H&Y Stage 3.0        Floor > Stand defer sec                      Precautions: fall risk  Past Medical History:   Diagnosis Date   • Hypertension              "

## 2024-03-25 ENCOUNTER — OFFICE VISIT (OUTPATIENT)
Facility: CLINIC | Age: 59
End: 2024-03-25
Payer: COMMERCIAL

## 2024-03-25 DIAGNOSIS — G25.9 MOVEMENT DISORDER: ICD-10-CM

## 2024-03-25 DIAGNOSIS — G20.A1 PARKINSON'S DISEASE, UNSPECIFIED WHETHER DYSKINESIA PRESENT, UNSPECIFIED WHETHER MANIFESTATIONS FLUCTUATE: Primary | ICD-10-CM

## 2024-03-25 PROCEDURE — 97112 NEUROMUSCULAR REEDUCATION: CPT

## 2024-03-25 NOTE — PROGRESS NOTES
Daily Note     POC expires Auth Status Total   Visits  Start date  Expiration date PT/OT + Visit Limit? Co-Insurance   24 Submitted   3/18/24  BOMN No                                           Visit/Unit Tracking  AUTH Status: submitted Date 3/18/24 3/25/24             Visits  Authed: pending Used eval 1              Remaining  11 10                 Neurologist Dr. Carvajal   Last Visit to Neurologist 8/3/23   Medication Sinemet   Medication Frequency 3x per day   Dosing Times        Today's date: 3/25/2024  Patient name: Vasile Matthews  : 1965  MRN: [id number]  Referring provider: Kt Jain*  Dx:   Encounter Diagnosis     ICD-10-CM    1. Parkinson's disease, unspecified whether dyskinesia present, unspecified whether manifestations fluctuate  G20.A1       2. Movement disorder  G25.9                      Subjective: Pt presents to clinic with no new complaints/LOB.       Objective: See treatment diary below    NMR:   Large Amplitude Exercises (2# wrist weights, 3# ankle weights)   - Seated floor to ceiling: 10 reps, 10 sec holds + finger flicks   - Seated side to side: 10 reps B/L, 10 sec holds + finger flicks   - Forward step: 10 reps B/L   - Lateral step: 10 reps B/L   - Backwards step: 10 reps B/L  - Rock and Reach  , 10 reps B/L  - Twist: 10 reps R/L  - Sit to stand  20 reps  - Walking with large amplitude steps and UE swing . Used agility ladder and cues him to step in every other square      Assessment: Tolerated treatment well with review of daily exercises for large amplitude movements, with added challenge using wrist/ankle weights. Patient  demos good technique with intermittent cueing for proper form/increased amplitude. He was fatigued post exercises and required prolonged seated rest break for energy recovery, vitals in therapeutic range.  He will continue to benefit from skilled PT interventions to address deficits and maximize functional independence.     Plan: Continue per  plan of care.  Progress treatment as tolerated.   Interval/ circuit training                  Outcome Measures Initial Eval  8/2/23 Re-Evaluation  3/18/24       5xSTS 23.3 sec, no UE 9.29 sec, no UE       TUG  - Regular  - Cognitive  - Carry   9.03 sec  NP sec  9.5 sec   9.1 sec  8.25 sec   7.0 sec         MiniBEST 17/28 24/28       10 meter 0.96 m/s 1.07 m/s        6MWT defer ft        ABC defer%        PDQ-39 defer/100        H&Y Stage 3.0        Floor > Stand defer sec                      Precautions: fall risk  Past Medical History:   Diagnosis Date    Hypertension

## 2024-04-08 ENCOUNTER — APPOINTMENT (OUTPATIENT)
Facility: CLINIC | Age: 59
End: 2024-04-08
Payer: COMMERCIAL

## 2024-04-15 ENCOUNTER — OFFICE VISIT (OUTPATIENT)
Facility: CLINIC | Age: 59
End: 2024-04-15
Payer: COMMERCIAL

## 2024-04-15 DIAGNOSIS — G20.A1 PARKINSON'S DISEASE, UNSPECIFIED WHETHER DYSKINESIA PRESENT, UNSPECIFIED WHETHER MANIFESTATIONS FLUCTUATE: Primary | ICD-10-CM

## 2024-04-15 DIAGNOSIS — G25.9 MOVEMENT DISORDER: ICD-10-CM

## 2024-04-15 PROCEDURE — 97112 NEUROMUSCULAR REEDUCATION: CPT | Performed by: PHYSICAL THERAPIST

## 2024-04-15 NOTE — PROGRESS NOTES
Daily Note     POC expires Auth Status Total   Visits  Start date  Expiration date PT/OT + Visit Limit? Co-Insurance   24 Submitted   3/18/24  BOMN No                                           Visit/Unit Tracking  AUTH Status: submitted Date 3/18/24 3/25/24 4/15            Visits  Authed: pending Used eval 1 1             Remaining  11 10 9                Neurologist Dr. Carvajal   Last Visit to Neurologist 8/3/23   Medication Sinemet   Medication Frequency 3x per day   Dosing Times        Today's date: 4/15/2024  Patient name: Vasile Matthews  : 1965  MRN: [id number]  Referring provider: Kt Jain*  Dx:   Encounter Diagnosis     ICD-10-CM    1. Parkinson's disease, unspecified whether dyskinesia present, unspecified whether manifestations fluctuate  G20.A1       2. Movement disorder  G25.9                    Subjective: Pt presents to clinic 15 min late with no new complaints/LOB. Wants to continue with PT 1x/week.       Objective: See treatment diary below    NMR:   Large Amplitude Exercises (2# wrist weights, 3# ankle weights)   - Seated floor to ceilin reps, 10 sec holds + finger flicks   - Seated side to side: 8 reps B/L, 10 sec holds + finger flicks   - Forward step: 10 reps B/L   - Lateral step: 10 reps B/L   - Backwards step: 10 reps B/L  - Rock and Reach  , 10 reps B/L  - Twist: 10 reps R/L  - Sit to stand  20 reps    NOT PERFORMED TODAY:  - Walking with large amplitude steps and UE swing . Used agility ladder and cues him to step in every other square    Assessment: Pt tolerated treatment well with review of daily exercises for large amplitude movements, with added challenge using wrist/ankle weights. Poor carryover of form from prior sessions. Requires mirroring and vc's to perform correctly. Pt reports wanting to continue 1x/week with PT before transitioning to HEP. He will continue to benefit from skilled PT interventions to address deficits and maximize functional  independence.     Plan: Continue per plan of care.  Progress treatment as tolerated.   Re-eval next visit.                  Outcome Measures Initial Eval  8/2/23 Re-Evaluation  3/18/24       5xSTS 23.3 sec, no UE 9.29 sec, no UE       TUG  - Regular  - Cognitive  - Carry   9.03 sec  NP sec  9.5 sec   9.1 sec  8.25 sec   7.0 sec         MiniBEST 17/28 24/28       10 meter 0.96 m/s 1.07 m/s        6MWT defer ft        ABC defer%        PDQ-39 defer/100        H&Y Stage 3.0        Floor > Stand defer sec                      Precautions: fall risk  Past Medical History:   Diagnosis Date    Hypertension

## 2024-04-30 ENCOUNTER — EVALUATION (OUTPATIENT)
Facility: CLINIC | Age: 59
End: 2024-04-30
Payer: COMMERCIAL

## 2024-04-30 DIAGNOSIS — G20.A1 PARKINSON'S DISEASE, UNSPECIFIED WHETHER DYSKINESIA PRESENT, UNSPECIFIED WHETHER MANIFESTATIONS FLUCTUATE: Primary | ICD-10-CM

## 2024-04-30 DIAGNOSIS — G25.9 MOVEMENT DISORDER: ICD-10-CM

## 2024-04-30 PROCEDURE — 97530 THERAPEUTIC ACTIVITIES: CPT

## 2024-04-30 NOTE — LETTER
2024    Sharla Carvajal MD  [address] [place] [address]    Patient: Vasile Matthews   YOB: 1965   Date of Visit: 2024     Encounter Diagnosis     ICD-10-CM    1. Parkinson's disease, unspecified whether dyskinesia present, unspecified whether manifestations fluctuate  G20.A1       2. Movement disorder  G25.9           Dear Dr. Carvajal:    Thank you for your recent referral of Vasile Matthews. Please review the attached evaluation summary from Vasile's recent visit.     Please verify that you agree with the plan of care by signing the attached order.     If you have any questions or concerns, please do not hesitate to call.     I sincerely appreciate the opportunity to share in the care of one of your patients and hope to have another opportunity to work with you in the near future.       Sincerely,    Karen Mack, PT      Referring Provider:      I certify that I have read the below Plan of Care and certify the need for these services furnished under this plan of treatment while under my care.                    Sharla Carvajal MD  [address] [place] [address]  Via Fax: [phone]          PT Re-Evaluation          POC expires Auth Status Total   Visits  Start date  Expiration date PT/OT + Visit Limit? Co-Insurance   24 Submitted   3/18/24  BOMN No   24                                      Visit/Unit Tracking  AUTH Status: submitted Date 3/18/24 3/25/24 4/15 4/30           Visits  Authed: pending Used eval 1 1 1            Remaining  11 10 9 8               Neurologist Dr. Carvajal   Last Visit to Neurologist 8/3/23   Medication Sinemet   Medication Frequency 3x per day   Dosing Times               Today's date: 2024  Patient name: Vasile Matthews  : 1965  MRN: [id number]  Referring provider: Sharla Carvajal MD  Dx:   Encounter Diagnosis     ICD-10-CM    1. Parkinson's disease, unspecified whether dyskinesia present,  unspecified whether manifestations fluctuate  G20.A1       2. Movement disorder  G25.9                   Assessment  Assessment details: Patient is a 59 y.o. Male who presents to skilled outpatient PT with diagnosis of Parkinson's disease. Patient had been a patient of the balance center in 2023 (stopped due to transportation issues). Patient returns with reports of recently being able to increase his activity level and he has experienced no recent falls. Since patient's last reassessment, he has shown improvements in MiniBest, TUG reg/cog and 10 MWT, suggesting improvements in reactive balance, functional mobility, dual tasking and gait speed, respectively. Mild, non-significant regressions noted in 5 x STS. His 6 MWT score is below age normative values, suggesting decreased cardiovascular endurance. However, based on the above outcome measures, he categorizes as a LOW fall risk, per APTA and Rehab Measure Cutoff scores. His goal continues to be to improve his activity level and keep exercising as this makes him feel better with his day to day life activities. Continue therapy 1x a week for 1 month focused on establishing comprehensive exercise program for independence upon discharge with planned follow-up 2-5 months post-discharge to determine efficacy. Patient in agreement with plan. Patient has met 5 goals at this time and continues to work towards remaining goals. Per research provided by APTA, patient will benefit from skilled outpatient PT services to improve and maximize his/her function, to reduce risk for falls and potential injuries associated with falls, reduce healthcare costs via hospitalization, and reduce patient and national healthcare costs. In early stages of Parkinson's Disease, research indicates that intensive physical exercise can improve patient's motor control and assist in slowing the disease progression as a neuroprotective agent. Patient will benefit from skilled outpatient PT in order to  maximize function in the short-term and long-term with overall improved postural strength with associated stability and mobility.      Impairments: Abnormal coordination, Abnormal gait, Abnormal muscle tone, Abnormal or restricted ROM, Activity intolerance, Impaired balance, Impaired physical strength, Lacks appropriate HEP, Poor posture, Poor body mechanics, Pain with function, Safety issue, Weight-bearing intolerance, Abnormal movement, Difficulty understanding, Abnormal muscle firing  Understanding of Dx/Px/POC: Good  Prognosis: Good    Patient verbalized understanding of POC.    Please contact me if you have any questions or recommendations. Thank you for the referral and the opportunity to share in Frisuly Matthews's care.      Plan  Plan details: skilled PT focused on establishing comprehensive HEP to promote active lifestyle; next visit please complete 6MWT and UPDRS Part III  Planned modality interventions: Biofeedback, Cryotherapy, TENS, Thermotherapy  Planned therapy interventions: Abdominal trunk stabilization, ADL training, Balance, Balance/WB training, Breathing training, Body mechanics training, Coordination, Functional ROM exercises, Gait training, HEP, Joint Mobilization, Manual Therapy, Smith taping, Motor coordination training, Neuromuscular re-education, Patient education, Postural training, Strengthening, Stretching, Therapeutic activities, Therapeutic exercises, Therapeutic training, Transfer training, Activity modification, Work reintegration  Frequency: 1x/wk  Duration in weeks: 8  Plan of Care beginning date: 4/30/24  Plan of Care expiration date: 8 weeks - 6/25/24  Treatment plan discussed with: Patient          Goals  Short Term Goals (4 weeks):  - Patient will improve 5 STS by the MDC of 2.3 sec indicating an improvement in strength and decreased challenge with transfers- NOT MET   - Patient will be independent in basic HEP in order to manage condition at home- MET  - Patient will complete  6MWT to assess cardiovascular endurance- MET    Long Term Goals (8 weeks):  - Patient will be independent with comprehensive exercise program- NOT MET   - Patient will be able to ambulate on uneven surfaces with 50% reduction in near falls to increase safety with community mobility- MET   - Patient will be able to perform a floor transfer without physical assistance to assist with fall recovery at home and in the community- MET  - Patient will be able to walk up incline with decreased difficulty and no loss of balance in order to improve functional mobility throughout the community- NOT MET  - Patient will be able to perform sit to stands from softer surfaces such as a couch or bed in order to improvement function with transfers- MET      Cut off score   All date taken from APTA Neuro Section or Rehab Measures      PEREZ Cutoff Scores:  MDC: 5 pts  Falls Risk Cutoff: 40.22/56 DGI Cutoff Scores:  Lionel et al 2011, MDC: 2.9 pts  Laura et al 2008, Michelle: Score <19/24   MiniBEST Cutoff Scores:  Angel et al 2011, MDC: 5.52 pts  Shailesh 2013, Michelle: <19/28 5xSTS Cutoff Scores:  MDC: 2.3 sec  Kunal et al, 2011, Michelle: > 16 sec   TUG Cutoff Scores:  Beau Jackman et al, 2011, MDC: 4.8 sec  Violet et al, 2011, Fallers: Meds ON: < 12.21 sec, OFF: 15.5 sec 10 Meter Walk Test Cutoff Scores:  Brittaney, 2008, MDC: 0.18 m/s  Age Norm Values, Michelle: < 1.0 m/s   ABC Cutoff Scores:  Simi, 2008, MDC: 13 pts  Beau Arreguin, MDC: 11.12 pts  Increased risk for falls: < 69% 6 Minute Walk Test Cutoff Scores:  Brittaney, 2008, MDC: 269 ft  Luis F et al, 2002, Norm Data Healthy Adults:  60 - 69 years:   M: 572 m      F: 538 m  70 - 79 years:   M: 527 m      F: 471 m  80 - 89 years:   M: 417 m      F: 392 m   PDQ-39 Cutoff Scores:  Janki et al, 2004:  MDC: Mobility (12.24), ADL (16.72), Emotional (14.22), Stigma (21.21), Social Support (21.25), Cognition (21.12), Communication (21.04),  "Bodily Discomfort (24.48)  Carina et al, 2007:  Normative Data: Mobility (49.25), ADL (38.94), Emotional (37.92), Stigma (27.54), Social Support (14.78), Cognition (33.03), Communication (27.99), Bodily Discomfort (40.91) Nilda and Yahr Stages  Stage 0: No S/S  Stage 1: Mild unilateral symptoms  Stage 1.5: Unilateral and axial symptoms  Stage 2: Bilateral symptoms, no balance impairment  Stage 2.5: Mild bilateral symptoms and recovery with pull test  Stage 3: Mild to moderate postural instability, independent  Stage 4: Severe disability, walking or standing unassisted  Stage 5: Bed bound, w/c bound           Subjective Evaluation    History of Present Illness  Mechanism of injury: Patient reports with diagnosis of Parkinson's Disease about 1 month ago via Dr. Carvajal. However he has been experiencing symptoms including tremors since 2019. He takes Sinemet 3x per day.     Update 3/18/24: Patient reports he has been more active recently. He reports no new falls. He reports he feels he is walking better and \"shaking\" less. He continues to take Sinemet 3x per day. His goal is to return to his prior level of activity.     Update 4/30/24: \"therapy has been making me feel better.\" No new falls. His goal is to keep exercising.     Primary AD: none  Previous Programs: none    Pain  L shoulder/arm - chronic; currently 5-6/10     Social Support  Steps to enter house: 2-3 ELIDA   Stairs in house: full flight    Lives with: wife    Employment status: not working  Hand dominance: RHD    Treatments  Previous treatment: Sinemet  Current treatment: PT   Diagnostic Testing: pending      Gait abnormalities: shuffling gait, decreased/absent arm swing, decreased step lengths         Outcome Measures Initial Eval  8/2/23 Re-Evaluation  3/18/24 Re-evaluation 4/30/24      5xSTS 23.3 sec, no UE 9.29 sec, no UE 9.77, no UE       TUG  - Regular  - Cognitive  - Carry   9.03 sec  NP sec  9.5 sec   9.1 sec  8.25 sec   7.0 sec     5.97 sec  6.55 " sec  7.47 sec      MiniBEST 17/28 24/28 25/28      10 meter 0.96 m/s 1.07 m/s  1.36 m/s      6MWT defer ft  1200 ft      ABC defer%        PDQ-39 defer/100        H&Y Stage 3.0        Floor > Stand defer sec                      Precautions: fall risk  Past Medical History:   Diagnosis Date   • Hypertension

## 2024-04-30 NOTE — PROGRESS NOTES
PT Re-Evaluation          POC expires Auth Status Total   Visits  Start date  Expiration date PT/OT + Visit Limit? Co-Insurance   24 Submitted   3/18/24  BOMN No   24                                      Visit/Unit Tracking  AUTH Status: submitted Date 3/18/24 3/25/24 4/15 4/30           Visits  Authed: pending Used eval 1 1 1            Remaining  11 10 9 8               Neurologist Dr. Carvajal   Last Visit to Neurologist 8/3/23   Medication Sinemet   Medication Frequency 3x per day   Dosing Times               Today's date: 2024  Patient name: Vasile Matthews  : 1965  MRN: [id number]  Referring provider: Sharla Carvajal MD  Dx:   Encounter Diagnosis     ICD-10-CM    1. Parkinson's disease, unspecified whether dyskinesia present, unspecified whether manifestations fluctuate  G20.A1       2. Movement disorder  G25.9                   Assessment  Assessment details: Patient is a 59 y.o. Male who presents to skilled outpatient PT with diagnosis of Parkinson's disease. Patient had been a patient of the balance center in  (stopped due to transportation issues). Patient returns with reports of recently being able to increase his activity level and he has experienced no recent falls. Since patient's last reassessment, he has shown improvements in MiniBest, TUG reg/cog and 10 MWT, suggesting improvements in reactive balance, functional mobility, dual tasking and gait speed, respectively. Mild, non-significant regressions noted in 5 x STS. His 6 MWT score is below age normative values, suggesting decreased cardiovascular endurance. However, based on the above outcome measures, he categorizes as a LOW fall risk, per APTA and Rehab Measure Cutoff scores. His goal continues to be to improve his activity level and keep exercising as this makes him feel better with his day to day life activities. Continue therapy 1x a week for 1 month focused on establishing comprehensive exercise program for  independence upon discharge with planned follow-up 2-5 months post-discharge to determine efficacy. Patient in agreement with plan. Patient has met 5 goals at this time and continues to work towards remaining goals. Per research provided by [employer], patient will benefit from skilled outpatient PT services to improve and maximize his/her function, to reduce risk for falls and potential injuries associated with falls, reduce healthcare costs via hospitalization, and reduce patient and national healthcare costs. In early stages of Parkinson's Disease, research indicates that intensive physical exercise can improve patient's motor control and assist in slowing the disease progression as a neuroprotective agent. Patient will benefit from skilled outpatient PT in order to maximize function in the short-term and long-term with overall improved postural strength with associated stability and mobility.      Impairments: Abnormal coordination, Abnormal gait, Abnormal muscle tone, Abnormal or restricted ROM, Activity intolerance, Impaired balance, Impaired physical strength, Lacks appropriate HEP, Poor posture, Poor body mechanics, Pain with function, Safety issue, Weight-bearing intolerance, Abnormal movement, Difficulty understanding, Abnormal muscle firing  Understanding of Dx/Px/POC: Good  Prognosis: Good    Patient verbalized understanding of POC.    Please contact me if you have any questions or recommendations. Thank you for the referral and the opportunity [facility].      Plan  Plan details: skilled PT focused on establishing comprehensive HEP to promote active lifestyle; next visit please complete 6MWT and UPDRS Part III  Planned modality interventions: Biofeedback, Cryotherapy, TENS, Thermotherapy  Planned therapy interventions: Abdominal trunk stabilization, ADL training, Balance, Balance/WB training, Breathing training, Body mechanics training, Coordination, Functional ROM exercises, Gait  training, HEP, Joint Mobilization, Manual Therapy, Smith taping, Motor coordination training, Neuromuscular re-education, Patient education, Postural training, Strengthening, Stretching, Therapeutic activities, Therapeutic exercises, Therapeutic training, Transfer training, Activity modification, Work reintegration  Frequency: 1x/wk  Duration in weeks: 8  Plan of Care beginning date: 4/30/24  Plan of Care expiration date: 8 weeks - 6/25/24  Treatment plan discussed with: Patient          Goals  Short Term Goals (4 weeks):  - Patient will improve 5 STS by the MDC of 2.3 sec indicating an improvement in strength and decreased challenge with transfers- NOT MET   - Patient will be independent in basic HEP in order to manage condition at home- MET  - Patient will complete 6MWT to assess cardiovascular endurance- MET    Long Term Goals (8 weeks):  - Patient will be independent with comprehensive exercise program- NOT MET   - Patient will be able to ambulate on uneven surfaces with 50% reduction in near falls to increase safety with community mobility- MET   - Patient will be able to perform a floor transfer without physical assistance to assist with fall recovery at home and in the community- MET  - Patient will be able to walk up incline with decreased difficulty and no loss of balance in order to improve functional mobility throughout the community- NOT MET  - Patient will be able to perform sit to stands from softer surfaces such as a couch or bed in order to improvement function with transfers- MET      Cut off score   All date taken from APTA Neuro Section or Rehab Measures      PEREZ Cutoff Scores:  MDC: 5 pts  Falls Risk Cutoff: 40.22/56 DGI Cutoff Scores:  Flowers et al 2011, MDC: 2.9 pts  Laura et al 2008, Michelle: Score <19/24   MiniBEST Cutoff Scores:  Angel et al 2011, MDC: 5.52 pts  Shailesh 2013, Michelle: <19/28 5xSTS Cutoff Scores:  MDC: 2.3 sec  Kunal et al, 2011, Michelle: > 16 sec   TUG Cutoff  "Scores:  Beau Mcmanus, 2011, MDC: 4.8 sec  Violet et al, 2011, Fallers: Meds ON: < 12.21 sec, OFF: 15.5 sec 10 Meter Walk Test Cutoff Scores:  Brittaney, 2008, MDC: 0.18 m/s  Age Norm Values, Michelle: < 1.0 m/s   ABC Cutoff Scores:  Simi, 2008, MDC: 13 pts  Beau SherwoodRomero, MDC: 11.12 pts  Increased risk for falls: < 69% 6 Minute Walk Test Cutoff Scores:  Brittaney, 2008, MDC: 269 ft  Luis F et al, 2002, Norm Data Healthy Adults:  60 - 69 years:   M: 572 m      F: 538 m  70 - 79 years:   M: 527 m      F: 471 m  80 - 89 years:   M: 417 m      F: 392 m   PDQ-39 Cutoff Scores:  Kala, 2004:  MDC: Mobility (12.24), ADL (16.72), Emotional (14.22), Stigma (21.21), Social Support (21.25), Cognition (21.12), Communication (21.04), Bodily Discomfort (24.48)  Carina et al, 2007:  Normative Data: Mobility (49.25), ADL (38.94), Emotional (37.92), Stigma (27.54), Social Support (14.78), Cognition (33.03), Communication (27.99), Bodily Discomfort (40.91) Nilda and Yahr Stages  Stage 0: No S/S  Stage 1: Mild unilateral symptoms  Stage 1.5: Unilateral and axial symptoms  Stage 2: Bilateral symptoms, no balance impairment  Stage 2.5: Mild bilateral symptoms and recovery with pull test  Stage 3: Mild to moderate postural instability, independent  Stage 4: Severe disability, walking or standing unassisted  Stage 5: Bed bound, w/c bound           Subjective Evaluation    History of Present Illness  Mechanism of injury: Patient reports with diagnosis of Parkinson's Disease about 1 month ago via Dr. Carvjaal. However he has been experiencing symptoms including tremors since 2019. He takes Sinemet 3x per day.     Update 3/18/24: Patient reports he has been more active recently. He reports no new falls. He reports he feels he is walking better and \"shaking\" less. He continues to take Sinemet 3x per day. His goal is to return to his prior level of activity.     Update 4/30/24: \"therapy has been " "making me feel better.\" No new falls. His goal is to keep exercising.     Primary AD: none  Previous Programs: none    Pain  L shoulder/arm - chronic; currently 5-6/10     Social Support  Steps to enter house: 2-3 ELIDA   Stairs in house: full flight    Lives with: wife    Employment status: not working  Hand dominance: RHD    Treatments  Previous treatment: Sinemet  Current treatment: PT   Diagnostic Testing: pending      Gait abnormalities: shuffling gait, decreased/absent arm swing, decreased step lengths         Outcome Measures Initial Eval  8/2/23 Re-Evaluation  3/18/24 Re-evaluation 4/30/24      5xSTS 23.3 sec, no UE 9.29 sec, no UE 9.77, no UE       TUG  - Regular  - Cognitive  - Carry   9.03 sec  NP sec  9.5 sec   9.1 sec  8.25 sec   7.0 sec     5.97 sec  6.55 sec  7.47 sec      MiniBEST 17/28 24/28 25/28      10 meter 0.96 m/s 1.07 m/s  1.36 m/s      6MWT defer ft  1200 ft      ABC defer%        PDQ-39 defer/100        H&Y Stage 3.0        Floor > Stand defer sec                      Precautions: fall risk  Past Medical History:   Diagnosis Date    Hypertension        "

## 2024-08-28 ENCOUNTER — EVALUATION (OUTPATIENT)
Facility: CLINIC | Age: 59
End: 2024-08-28
Payer: COMMERCIAL

## 2024-08-28 DIAGNOSIS — M54.16 LUMBAR RADICULOPATHY: Primary | ICD-10-CM

## 2024-08-28 DIAGNOSIS — M54.31 SCIATICA OF RIGHT SIDE: ICD-10-CM

## 2024-08-28 PROCEDURE — 97162 PT EVAL MOD COMPLEX 30 MIN: CPT

## 2024-08-28 NOTE — PROGRESS NOTES
PT Evaluation     Today's date: 2024  Patient name: Vasiel Matthews  : 1965  MRN: [id number]  Referring provider: Samuel Saxena PA-C  Dx:   Encounter Diagnosis     ICD-10-CM    1. Lumbar radiculopathy  M54.16                                                                                    POC expires Unit limit Auth Expiration date PT/OT + Visit Limit? Co-Insurance   24   \ No                                            Today's date: 2024  Patient name: Vasile Matthews  : 1965  MRN: [id number]  Referring provider: Samuel Saxena PA-C  Dx:   Encounter Diagnosis     ICD-10-CM    1. Lumbar radiculopathy  M54.16             Assessment  Assessment details: Vasile Matthews is an 59 y.o. male  arriving to clinic status with complaints of low back and RLE pain. Objective measures reveled deficits in functional strength and endurance, and lumbar screen exhibiting impaired ROM with pain reported with flexion and extension. Pain radiates to RLE and only able to centralize with prone single knee to chest. Due to current deficits, patient is limited functionally with recreational activities, ambulation, stair negotiation, lifting/carrying, transfers. Patient has been educated in home exercise program and plan of care. Also educated on log roll technique to decrease pain with bed mobility. Patient would benefit from skilled physical therapy services to address their aforementioned functional limitations and progress towards prior level of function and independence with home exercise program.          Impairments: Abnormal coordination, Abnormal gait, Abnormal muscle tone, Abnormal or restricted ROM, Activity intolerance, Impaired balance, Impaired physical strength, Lacks appropriate HEP, Poor posture, Poor body mechanics, Pain with function, Safety issue, Weight-bearing intolerance, Abnormal movement, Difficulty understanding, Abnormal muscle firing  Understanding of Dx/Px/POC: Excellent  Prognosis:  Good    Patient verbalized understanding of POC.         Please contact me if you have any questions or recommendations. Thank you for the referral and the opportunity to share in Vasile Matthews's care.        Plan  Plan details: transition to ortho PT  Patient would benefit from: PT Eval and Skilled PT  Planned modality interventions: Biofeedback, Cryotherapy, TENS, Thermotherapy  Planned therapy interventions: Abdominal trunk stabilization, ADL training, Balance, Balance/WB training, Breathing training, Body mechanics training, Coordination, Functional ROM exercises, Gait training, HEP, Joint Mobilization, Manual Therapy, Smith taping, Motor coordination training, Neuromuscular re-education, Patient education, Postural training, Strengthening, Stretching, Therapeutic activities, Therapeutic exercises, Therapeutic training, Transfer training, Activity modification, Work reintegration  Frequency: 2x/wk  Duration in weeks: 12  Plan of Care beginning date: 8/28/24  Plan of Care expiration date: 12 weeks - 11/20/2024  Treatment plan discussed with: Patient       Goals  Short Term Goals (4 weeks):    1. Initiate and advance home exercise program to self manage symptoms.  2. Improve postural awareness and demonstrate self postural correction.  3. Improve AROM lumbar spine to minimal limitation or better to improve mobility  4. Patient to reduce pain at worst to 4/10 at worst to improve activity tolerance.     Long Term Goals:  12 weeks  1. Patient to exhibit full independence with home exercise program for symptoms relief and prevention   2. Patient to achieve lumbar ROM to WFL without increased pain.  3. Improve LE strength to 5/5 grossly to improve general mobility  4. Improve FOTO score to  or better.  5. Patient to reduce pain at worst to 2/10 at worst to improve activity tolerance.  6. Patient will improve 5xSTS score by 2.3 seconds to promote improved LE functional strength needed for  "ADLs                Subjective    History of Present Illness  - Mechanism of injury: Patient went to urgent care on 24 due to a \"lot of pain\" in right leg. Per pt, they gave him an injection which provided pain relief for ~ 1 day; then pain returned so he went back to [facility] and they gave him muscle relaxer and referred him to PT. He describes pain as cramping from right lower back down to right foot. Reports pain is worse first thing in the morning. He states this is limiting him from lifting heavy things around the house and difficulty ambulating.     - Primary AD: none  - Assist level at home: independent      Patient goal: to get rid of pain    Pain  - Current pain ratin/10  - At best pain ratin/10  - At worst pain ratin/10  - Location: right low back, RLE  - Aggravating factors: first thing in AM, walking    Social Support  - Steps to enter house: yes  - Stairs in house: yes   - Lives in: house  - Lives with: wife and 2 kids    - Employment status: retired  - Hand dominance: right    Treatments  - Previous treatment: neuro PT for PD  - Current treatment: initiating PT eval/ tx for LBP  - Diagnostic Testing: he denies getting Xray, MRI, CT      Objective       LE MMT  - R Hip Flexion: 4/5  L Hip Flexion: 5/5  - R Hip Extension: 3+/5  L Hip Extension: 3-/5  - R Hip Abduction: 4+/5  L Hip Abduction: 5/5  - R Hip Adduction: 4+/5  L Hip Adduction: 5/5  - R Knee Extension: 4/5  L Knee Extension: 5/5  - R Knee Flexion: 4/5  L Knee Flexion: 5/5  - R Ankle DF: 4-/5   L Ankle DF: 5/5  - R Ankle PF: 4/5   L Ankle PF: 5/5    Lumbar ROM:  Flexion: moderate limitation, pain  Extension: significant limitation, pain  R rotation: WNL, no pain  L rotation: WNL, no pain  R sidebending: WNL, no pain  L sidebending WNL, no pain    Sensation  - Light touch: intact B/L; he reports intermittent \"tingling\" RLE  - Deep pressure: intact        Postural Screen  - Observation: favors weightbearing through LLE, mild left lateral " trunk lean  - Improvement in symptoms with correction of posture: Yes    Repeated Flexion in standing: no change in symptoms  Repeated extension in standing: no change in symptoms  Prone on elbows: worsening pain, no centralization  Single knee to chest (prone): reduction in symptoms    Gait  - Abnormalities: antalgic gait with decreased RLE stance           Outcome Measures Initial Eval  8/28/24        5xSTS 22.62 sec        10 meter   1.06 m/s                                     Precautions:   Past Medical History:   Diagnosis Date    Hypertension        Access Code: [email or web address]  URL: [email or web address]  Date: 08/28/2024  Prepared by: Edith Martell    Exercises  - Hooklying Single Knee to Chest Stretch  - 1 x daily - 7 x weekly - 3 sets - 10 reps - 10 hold  - Small Range Straight Leg Raise  - 1 x daily - 7 x weekly - 3 sets - 10 reps  - Supine Posterior Pelvic Tilt  - 1 x daily - 7 x weekly - 3 sets - 10 reps - 10 hold

## 2024-08-28 NOTE — LETTER
2024    Kt Jain MD  [address] [facility] [address]    Patient: Vasile Matthews   YOB: 1965   Date of Visit: 2024     Encounter Diagnosis     ICD-10-CM    1. Lumbar radiculopathy  M54.16       2. Sciatica of right side  M54.31           Dear Dr. Jain:    Thank you for your recent referral of Vasile Matthews. Please review the attached evaluation summary from Vasile's recent visit.     Please verify that you agree with the plan of care by signing the attached order.     If you have any questions or concerns, please do not hesitate to call.     I sincerely appreciate the opportunity to share in the care of one of your patients and hope to have another opportunity to work with you in the near future.       Sincerely,    Edith Martell, PT      Referring Provider:      I certify that I have read the below Plan of Care and certify the need for these services furnished under this plan of treatment while under my care.                    Kt Jain MD  [address] [facility] [address]  Via Fax: [phone]          PT Evaluation     Today's date: 2024  Patient name: Vasile Matthews  : 1965  MRN: [id number]  Referring provider: Samuel Saxena PA-C  Dx:   Encounter Diagnosis     ICD-10-CM    1. Lumbar radiculopathy  M54.16                                                                                    POC expires Unit limit Auth Expiration date PT/OT + Visit Limit? Co-Insurance   24   \ No                                            Today's date: 2024  Patient name: Vasile Matthews  : 1965  MRN: [id number]  Referring provider: Samuel Saxena PA-C  Dx:   Encounter Diagnosis     ICD-10-CM    1. Lumbar radiculopathy  M54.16             Assessment  Assessment details: Vasile Matthews is an 59 y.o. male  arriving to clinic status with complaints of low back and RLE pain. Objective measures reveled deficits in functional  strength and endurance, and lumbar screen exhibiting impaired ROM with pain reported with flexion and extension. Pain radiates to RLE and only able to centralize with prone single knee to chest. Due to current deficits, patient is limited functionally with recreational activities, ambulation, stair negotiation, lifting/carrying, transfers. Patient has been educated in home exercise program and plan of care. Also educated on log roll technique to decrease pain with bed mobility. Patient would benefit from skilled physical therapy services to address their aforementioned functional limitations and progress towards prior level of function and independence with home exercise program.          Impairments: Abnormal coordination, Abnormal gait, Abnormal muscle tone, Abnormal or restricted ROM, Activity intolerance, Impaired balance, Impaired physical strength, Lacks appropriate HEP, Poor posture, Poor body mechanics, Pain with function, Safety issue, Weight-bearing intolerance, Abnormal movement, Difficulty understanding, Abnormal muscle firing  Understanding of Dx/Px/POC: Excellent  Prognosis: Good    Patient verbalized understanding of POC.         Please contact me if you have any questions or recommendations. Thank you for the referral and the opportunity to share in Vasile Matthews's care.        Plan  Plan details: transition to ortho PT  Patient would benefit from: PT Eval and Skilled PT  Planned modality interventions: Biofeedback, Cryotherapy, TENS, Thermotherapy  Planned therapy interventions: Abdominal trunk stabilization, ADL training, Balance, Balance/WB training, Breathing training, Body mechanics training, Coordination, Functional ROM exercises, Gait training, HEP, Joint Mobilization, Manual Therapy, Smith taping, Motor coordination training, Neuromuscular re-education, Patient education, Postural training, Strengthening, Stretching, Therapeutic activities, Therapeutic exercises, Therapeutic training,  "Transfer training, Activity modification, Work reintegration  Frequency: 2x/wk  Duration in weeks: 12  Plan of Care beginning date: 24  Plan of Care expiration date: 12 weeks - 2024  Treatment plan discussed with: Patient       Goals  Short Term Goals (4 weeks):    1. Initiate and advance home exercise program to self manage symptoms.  2. Improve postural awareness and demonstrate self postural correction.  3. Improve AROM lumbar spine to minimal limitation or better to improve mobility  4. Patient to reduce pain at worst to 4/10 at worst to improve activity tolerance.     Long Term Goals:  12 weeks  1. Patient to exhibit full independence with home exercise program for symptoms relief and prevention   2. Patient to achieve lumbar ROM to WFL without increased pain.  3. Improve LE strength to 5/5 grossly to improve general mobility  4. Improve FOTO score to  or better.  5. Patient to reduce pain at worst to 2/10 at worst to improve activity tolerance.  6. Patient will improve 5xSTS score by 2.3 seconds to promote improved LE functional strength needed for ADLs                Subjective    History of Present Illness  - Mechanism of injury: Patient went to urgent care on 24 due to a \"lot of pain\" in right leg. Per pt, they gave him an injection which provided pain relief for ~ 1 day; then pain returned so he went back to [facility] and they gave him muscle relaxer and referred him to PT. He describes pain as cramping from right lower back down to right foot. Reports pain is worse first thing in the morning. He states this is limiting him from lifting heavy things around the house and difficulty ambulating.     - Primary AD: none  - Assist level at home: independent      Patient goal: to get rid of pain    Pain  - Current pain ratin/10  - At best pain ratin/10  - At worst pain ratin/10  - Location: right low back, RLE  - Aggravating factors: first thing in AM, walking    Social Support  - Steps to " "enter house: yes  - Stairs in house: yes   - Lives in: house  - Lives with: wife and 2 kids    - Employment status: retired  - Hand dominance: right    Treatments  - Previous treatment: neuro PT for PD  - Current treatment: initiating PT eval/ tx for LBP  - Diagnostic Testing: he denies getting Xray, MRI, CT      Objective       LE MMT  - R Hip Flexion: 4/5  L Hip Flexion: 5/5  - R Hip Extension: 3+/5  L Hip Extension: 3-/5  - R Hip Abduction: 4+/5  L Hip Abduction: 5/5  - R Hip Adduction: 4+/5  L Hip Adduction: 5/5  - R Knee Extension: 4/5  L Knee Extension: 5/5  - R Knee Flexion: 4/5  L Knee Flexion: 5/5  - R Ankle DF: 4-/5   L Ankle DF: 5/5  - R Ankle PF: 4/5   L Ankle PF: 5/5    Lumbar ROM:  Flexion: moderate limitation, pain  Extension: significant limitation, pain  R rotation: WNL, no pain  L rotation: WNL, no pain  R sidebending: WNL, no pain  L sidebending WNL, no pain    Sensation  - Light touch: intact B/L; he reports intermittent \"tingling\" RLE  - Deep pressure: intact        Postural Screen  - Observation: favors weightbearing through LLE, mild left lateral trunk lean  - Improvement in symptoms with correction of posture: Yes    Repeated Flexion in standing: no change in symptoms  Repeated extension in standing: no change in symptoms  Prone on elbows: worsening pain, no centralization  Single knee to chest (prone): reduction in symptoms    Gait  - Abnormalities: antalgic gait with decreased RLE stance           Outcome Measures Initial Eval  8/28/24        5xSTS 22.62 sec        10 meter   1.06 m/s                                     Precautions:   Past Medical History:   Diagnosis Date   • Hypertension        Access Code: [email or web address]  URL: [email or web address]  Date: 08/28/2024  Prepared by: Edith Martell    Exercises  - Hooklying Single Knee to Chest Stretch  - 1 x daily - 7 x weekly - 3 sets - 10 reps - 10 hold  - Small Range Straight Leg Raise  - 1 x daily - 7 x weekly - 3 sets - 10 " reps  - Supine Posterior Pelvic Tilt  - 1 x daily - 7 x weekly - 3 sets - 10 reps - 10 hold

## 2024-09-04 ENCOUNTER — OFFICE VISIT (OUTPATIENT)
Dept: PHYSICAL THERAPY | Facility: CLINIC | Age: 59
End: 2024-09-04
Payer: COMMERCIAL

## 2024-09-04 DIAGNOSIS — M54.31 SCIATICA OF RIGHT SIDE: ICD-10-CM

## 2024-09-04 DIAGNOSIS — M54.16 LUMBAR RADICULOPATHY: Primary | ICD-10-CM

## 2024-09-04 PROCEDURE — 97110 THERAPEUTIC EXERCISES: CPT | Performed by: PHYSICAL THERAPIST

## 2024-09-05 NOTE — PROGRESS NOTES
"   Daily Note     Today's date: 2024  Patient name: Vasile Matthews  : 1965  MRN: [id number]  Referring provider: Samuel Saxena PA-C  Dx:   Encounter Diagnosis     ICD-10-CM    1. Lumbar radiculopathy  M54.16       2. Sciatica of right side  M54.31                      Subjective: Pt presents to clinic after being transferred from the [facility]. Patient went to urgent care on 24 due to a \"lot of pain\" in right leg. Per pt, they gave him an injection which provided pain relief for ~ 1 day; then pain returned so he went back to [facility] and they gave him muscle relaxer and referred him to PT. He describes pain as cramping from right lower back down to right foot. Reports pain is worse first thing in the morning. He states this is limiting him from lifting heavy things around the house and difficulty ambulating      Objective: See treatment diary below        Objective     Postural Observations  Seated posture: fair  Standing posture: poor    Mechanical Assessment    Cervical      Thoracic    Lying extension: sustained positions  Pain location: peripheralized  Pain intensity: worse  Pain level: increased    Lumbar    Sitting flexion: repeated movements  Pain intensity: better  Pain level: decreased  Standing extension: repeated movements  Pain location: peripheralized  Pain intensity: worse  Pain level: increased    Muscle Activation   Patient able to activate left transverse abdominals and right transverse abdominals.     Functional Assessment      Squat    Trunk lean left.       LE MMT  - R Hip Flexion: 4/5  L Hip Flexion: 5/5  - R Hip Extension: 3+/5  L Hip Extension: 3-/5  - R Hip Abduction: 4+/5  L Hip Abduction: 5/5  - R Hip Adduction: 4+/5  L Hip Adduction: 5/5  - R Knee Extension: 4/5  L Knee Extension: 5/5  - R Knee Flexion: 4/5  L Knee Flexion: 5/5  - R Ankle DF: 4-/5   L Ankle DF: 5/5  - R Ankle PF: 4/5   L Ankle PF: 5/5    Lumbar ROM:  Flexion: moderate limitation, pain  Extension: " significant limitation, pain  R rotation: WNL, no pain  L rotation: WNL, no pain  R sidebending: WNL, no pain  L sidebending WNL, no pain      Assessment: Pain radiates to RLE and appears to improve slightly with gentle stretching/ flexion based exercises.  Due to current deficits, patient is limited functionally with recreational activities, ambulation, stair negotiation, lifting/carrying, transfers. He would benefit from additional physical therapy in order to address deficits and improve quality of movement.       POC expires Auth Status Total     Start date  Expiration date PT/OT + Visit Limit? Co-Insurance                                                      Visit/Unit Tracking  AUTH Status:  Date                 Authed:  Used                Remaining                     Date 09/04/24        Visit Number         FOTO Tracking         Manual                                                      TherEx         LTR  X 20         KTC  X 10         Seated lumbar flexion  X 10 5s hold                                                               Neuro Re-Ed         TA iso          TA progression                                                                TherAct                                                      Gait Training                                    Modalities

## 2024-09-09 ENCOUNTER — TELEPHONE (OUTPATIENT)
Dept: PHYSICAL THERAPY | Facility: CLINIC | Age: 59
End: 2024-09-09

## 2024-09-09 NOTE — TELEPHONE ENCOUNTER
Called patient regarding no-show appointment and spoke to his wife. Notes she was unaware of appointment, confirmed appointment scheduled for Wednesday, 9/11 at 4:30.    Reiterated attendance policy, will move patient to same-day appointments only if patient no-shows another appointment. Wife expressed understanding.

## 2024-09-11 ENCOUNTER — OFFICE VISIT (OUTPATIENT)
Dept: PHYSICAL THERAPY | Facility: CLINIC | Age: 59
End: 2024-09-11
Payer: COMMERCIAL

## 2024-09-11 DIAGNOSIS — M54.16 LUMBAR RADICULOPATHY: Primary | ICD-10-CM

## 2024-09-11 DIAGNOSIS — M54.31 SCIATICA OF RIGHT SIDE: ICD-10-CM

## 2024-09-11 PROCEDURE — 97110 THERAPEUTIC EXERCISES: CPT

## 2024-09-11 NOTE — PROGRESS NOTES
Daily Note     Today's date: 2024  Patient name: Vasile Matthews  : 1965  MRN: [id number]  Referring provider: Samuel Saxena PA-C  Dx:   Encounter Diagnosis     ICD-10-CM    1. Lumbar radiculopathy  M54.16       2. Sciatica of right side  M54.31           Start Time: 1635  Stop Time: 1715  Total time in clinic (min): 40 minutes    Subjective: Patient reports continued R LE and low back pain since previous session. States he continues to experience pain into posterior aspect of knee to popliteal fossa. Rates pain 6/10 pain at the start of today's session.      Objective: See treatment diary below    Lumbar Spine AROM:  - Flexion: significant loss with pain  - Extension: moderate loss with pain  - R SGIS: moderate loss with pain  - L SGIS: moderate loss with pain    Mechanical Exam:  Baseline Symptoms: 6/10 pain, right side extending to popliteal fossa  - PADILLA: increased  - Manual supine left flexion rotation: 3/10 pain to popliteal fossa  - Left flexion rotation in right sidelying: 3/10 pain to popliteal fossa  - R SGIS: decreased to 3/10, centralized to lumbar spine      Assessment: Increased time spend on mechanical exam during today's session as patient is new to PT. Patient demo high irritability of symptoms throughout session with eventual centralization of symptoms noted w/ R SGIS. Patient encouraged to perform R SGIS at wall 3-5x/day for HEP, handout provided. Patient educated on traffic light analogy for symptom monitoring/management. Patient verbalized good understanding/agreement. Patient will continue to benefit from skilled PT to improve functional mobility and symptom irritability.      Plan: Continue per plan of care.        POC expires Auth Status Total     Start date  Expiration date PT/OT + Visit Limit? Co-Insurance                                                      Visit/Unit Tracking  AUTH Status:  Date                 Authed:  Used                Remaining                     Date  8/28/24 09/04/24 9/11/24       Visit Number [id number] 2 3       FOTO Tracking          Manual                                                            TherEx          LTR   X 20         KTC   X 10         Seated lumbar flexion   X 10 5s hold                                                              Mechanical exam, test-retest x40 min       Neuro Re-Ed          TA iso           TA progression                                                                       TherAct                                                            Gait Training                                        Modalities

## 2024-09-16 ENCOUNTER — OFFICE VISIT (OUTPATIENT)
Dept: PHYSICAL THERAPY | Facility: CLINIC | Age: 59
End: 2024-09-16
Payer: COMMERCIAL

## 2024-09-16 DIAGNOSIS — M54.16 LUMBAR RADICULOPATHY: Primary | ICD-10-CM

## 2024-09-16 DIAGNOSIS — M54.31 SCIATICA OF RIGHT SIDE: ICD-10-CM

## 2024-09-16 PROCEDURE — 97110 THERAPEUTIC EXERCISES: CPT

## 2024-09-16 NOTE — PROGRESS NOTES
"Daily Note     Today's date: 2024  Patient name: Vasile Matthews  : 1965  MRN: [id number]  Referring provider: Samuel Saxena PA-C  Dx:   Encounter Diagnosis     ICD-10-CM    1. Lumbar radiculopathy  M54.16       2. Sciatica of right side  M54.31           Start Time: 1545  Stop Time: 1625  Total time in clinic (min): 40 minutes    Subjective: Patient reports that he is \"a little bit better\" since previous session. Notes greatest difficulty with laying down in bed, particularly when laying on his right side. States symptoms cont to extend to knee, modest improvement symptom intensity. Reports intermittent compliance with HEP since previous session.      Objective: See treatment diary below    Mechanical Exam:  Baseline symptoms: 4/10 to popliteal fossa on right  - R SGIS: 2/10  - R SGIS + extension: 2/10, centralized to glute  - PADILLA at plinth: 3/10, peripheralized to posterior thigh    Supine to long sit: upslip of right innominate      Assessment: Patient cont to demo high irritability of symptoms throughout today's session. Reviewed/adjusted technique with SGIS to limit compensatory side bending w/ resultant decrease in symptoms. Trial inf HVLA to R innominate due to irritability, estephania fair overall with minimal reduction in pain post. Encouraged patient to cont w/ R SGIS for HEP, pt verbalized good understanding/agreement. Patient will continue to benefit from skilled PT to improve functional mobility and symptom irritability.       Plan: Continue per plan of care.      POC expires Auth Status Total     Start date  Expiration date PT/OT + Visit Limit? Co-Insurance                                                      Visit/Unit Tracking  AUTH Status:  Date                 Authed:  Used                Remaining                     Date 24      Visit Number IE 2 3 4      FOTO Tracking          Manual              Inferior HVLA to R innominate x3                                "               TherEx          LTR   X 20         KTC   X 10         Seated lumbar flexion   X 10 5s hold                                                              Mechanical exam, test-retest x40 min Mechanical exam as noted above x40 min      Neuro Re-Ed          TA iso           TA progression                                                                       TherAct                                                            Gait Training                                        Modalities